# Patient Record
Sex: MALE | Race: WHITE | NOT HISPANIC OR LATINO | URBAN - METROPOLITAN AREA
[De-identification: names, ages, dates, MRNs, and addresses within clinical notes are randomized per-mention and may not be internally consistent; named-entity substitution may affect disease eponyms.]

---

## 2017-04-21 ENCOUNTER — OUTPATIENT (OUTPATIENT)
Dept: OUTPATIENT SERVICES | Facility: HOSPITAL | Age: 57
LOS: 1 days | End: 2017-04-21
Payer: COMMERCIAL

## 2017-04-21 VITALS
SYSTOLIC BLOOD PRESSURE: 106 MMHG | DIASTOLIC BLOOD PRESSURE: 75 MMHG | HEIGHT: 70 IN | OXYGEN SATURATION: 96 % | RESPIRATION RATE: 18 BRPM | HEART RATE: 75 BPM | TEMPERATURE: 98 F | WEIGHT: 315 LBS

## 2017-04-21 DIAGNOSIS — Z01.818 ENCOUNTER FOR OTHER PREPROCEDURAL EXAMINATION: ICD-10-CM

## 2017-04-21 DIAGNOSIS — E66.01 MORBID (SEVERE) OBESITY DUE TO EXCESS CALORIES: ICD-10-CM

## 2017-04-21 DIAGNOSIS — Z86.018 PERSONAL HISTORY OF OTHER BENIGN NEOPLASM: Chronic | ICD-10-CM

## 2017-04-21 DIAGNOSIS — Z98.84 BARIATRIC SURGERY STATUS: Chronic | ICD-10-CM

## 2017-04-21 DIAGNOSIS — I10 ESSENTIAL (PRIMARY) HYPERTENSION: ICD-10-CM

## 2017-04-21 DIAGNOSIS — Z98.890 OTHER SPECIFIED POSTPROCEDURAL STATES: Chronic | ICD-10-CM

## 2017-04-21 DIAGNOSIS — G47.33 OBSTRUCTIVE SLEEP APNEA (ADULT) (PEDIATRIC): ICD-10-CM

## 2017-04-21 DIAGNOSIS — Z87.2 PERSONAL HISTORY OF DISEASES OF THE SKIN AND SUBCUTANEOUS TISSUE: Chronic | ICD-10-CM

## 2017-04-21 LAB
ALBUMIN SERPL ELPH-MCNC: 4.3 G/DL — SIGNIFICANT CHANGE UP (ref 3.3–5)
ALP SERPL-CCNC: 41 U/L — SIGNIFICANT CHANGE UP (ref 40–120)
ALT FLD-CCNC: 25 U/L RC — SIGNIFICANT CHANGE UP (ref 10–45)
ANION GAP SERPL CALC-SCNC: 14 MMOL/L — SIGNIFICANT CHANGE UP (ref 5–17)
AST SERPL-CCNC: 21 U/L — SIGNIFICANT CHANGE UP (ref 10–40)
BILIRUB SERPL-MCNC: 0.6 MG/DL — SIGNIFICANT CHANGE UP (ref 0.2–1.2)
BLD GP AB SCN SERPL QL: NEGATIVE — SIGNIFICANT CHANGE UP
BUN SERPL-MCNC: 19 MG/DL — SIGNIFICANT CHANGE UP (ref 7–23)
CALCIUM SERPL-MCNC: 9.6 MG/DL — SIGNIFICANT CHANGE UP (ref 8.4–10.5)
CHLORIDE SERPL-SCNC: 102 MMOL/L — SIGNIFICANT CHANGE UP (ref 96–108)
CO2 SERPL-SCNC: 23 MMOL/L — SIGNIFICANT CHANGE UP (ref 22–31)
CREAT SERPL-MCNC: 0.69 MG/DL — SIGNIFICANT CHANGE UP (ref 0.5–1.3)
GLUCOSE SERPL-MCNC: 127 MG/DL — HIGH (ref 70–99)
HCT VFR BLD CALC: 47.2 % — SIGNIFICANT CHANGE UP (ref 39–50)
HGB BLD-MCNC: 16.1 G/DL — SIGNIFICANT CHANGE UP (ref 13–17)
MCHC RBC-ENTMCNC: 32.1 PG — SIGNIFICANT CHANGE UP (ref 27–34)
MCHC RBC-ENTMCNC: 34.1 GM/DL — SIGNIFICANT CHANGE UP (ref 32–36)
MCV RBC AUTO: 93.9 FL — SIGNIFICANT CHANGE UP (ref 80–100)
PLATELET # BLD AUTO: 217 K/UL — SIGNIFICANT CHANGE UP (ref 150–400)
POTASSIUM SERPL-MCNC: 4.2 MMOL/L — SIGNIFICANT CHANGE UP (ref 3.5–5.3)
POTASSIUM SERPL-SCNC: 4.2 MMOL/L — SIGNIFICANT CHANGE UP (ref 3.5–5.3)
PROT SERPL-MCNC: 7.5 G/DL — SIGNIFICANT CHANGE UP (ref 6–8.3)
RBC # BLD: 5.02 M/UL — SIGNIFICANT CHANGE UP (ref 4.2–5.8)
RBC # FLD: 12.1 % — SIGNIFICANT CHANGE UP (ref 10.3–14.5)
RH IG SCN BLD-IMP: POSITIVE — SIGNIFICANT CHANGE UP
SODIUM SERPL-SCNC: 139 MMOL/L — SIGNIFICANT CHANGE UP (ref 135–145)
WBC # BLD: 7.3 K/UL — SIGNIFICANT CHANGE UP (ref 3.8–10.5)
WBC # FLD AUTO: 7.3 K/UL — SIGNIFICANT CHANGE UP (ref 3.8–10.5)

## 2017-04-21 PROCEDURE — 85027 COMPLETE CBC AUTOMATED: CPT

## 2017-04-21 PROCEDURE — 86850 RBC ANTIBODY SCREEN: CPT

## 2017-04-21 PROCEDURE — 86900 BLOOD TYPING SEROLOGIC ABO: CPT

## 2017-04-21 PROCEDURE — G0463: CPT

## 2017-04-21 PROCEDURE — 80053 COMPREHEN METABOLIC PANEL: CPT

## 2017-04-21 PROCEDURE — 86901 BLOOD TYPING SEROLOGIC RH(D): CPT

## 2017-04-21 NOTE — H&P PST ADULT - PSH
H/O hernia repair    H/O laparoscopic adjustable gastric banding  2003- Defalted in 01/2017  H/O lipoma  h/o excision lipoma back  H/O melanoma excision    H/O pilonidal cyst  h/o excision plilonidal cyst

## 2017-04-21 NOTE — H&P PST ADULT - PMH
Anxiety    BPH (benign prostatic hypertrophy)    Degenerative disc disease, cervical    Depression    Dyslipidemia    Essential hypertension    GERD (gastroesophageal reflux disease)    Glaucoma    Morbid obesity due to excess calories    ROSIO (obstructive sleep apnea)    Osteoarthritis (arthritis due to wear and tear of joints)    Vertigo

## 2017-04-21 NOTE — H&P PST ADULT - HISTORY OF PRESENT ILLNESS
57 year old male with h/o morbid obesity, HTN, dyslipidemia, GERD, BPH , s/p lap band surgery for weight loss in 2003. Pt c/o GERD symptoms & unsuccessful weight loss- had surgical consult- for lap removal of gastric band, convert to vertical sleeve gastrectomy on 05/-3/2017

## 2017-04-27 ENCOUNTER — FORM ENCOUNTER (OUTPATIENT)
Age: 57
End: 2017-04-27

## 2017-04-28 ENCOUNTER — OUTPATIENT (OUTPATIENT)
Dept: OUTPATIENT SERVICES | Facility: HOSPITAL | Age: 57
LOS: 1 days | End: 2017-04-28
Payer: COMMERCIAL

## 2017-04-28 ENCOUNTER — APPOINTMENT (OUTPATIENT)
Dept: SURGERY | Facility: CLINIC | Age: 57
End: 2017-04-28

## 2017-04-28 DIAGNOSIS — Z98.890 OTHER SPECIFIED POSTPROCEDURAL STATES: Chronic | ICD-10-CM

## 2017-04-28 DIAGNOSIS — Z86.018 PERSONAL HISTORY OF OTHER BENIGN NEOPLASM: Chronic | ICD-10-CM

## 2017-04-28 DIAGNOSIS — Z98.84 BARIATRIC SURGERY STATUS: Chronic | ICD-10-CM

## 2017-04-28 DIAGNOSIS — R10.9 UNSPECIFIED ABDOMINAL PAIN: ICD-10-CM

## 2017-04-28 DIAGNOSIS — Z87.2 PERSONAL HISTORY OF DISEASES OF THE SKIN AND SUBCUTANEOUS TISSUE: Chronic | ICD-10-CM

## 2017-04-28 PROCEDURE — 76700 US EXAM ABDOM COMPLETE: CPT | Mod: 26

## 2017-04-28 PROCEDURE — 76700 US EXAM ABDOM COMPLETE: CPT

## 2017-05-03 ENCOUNTER — TRANSCRIPTION ENCOUNTER (OUTPATIENT)
Age: 57
End: 2017-05-03

## 2017-05-03 ENCOUNTER — APPOINTMENT (OUTPATIENT)
Dept: SURGERY | Facility: HOSPITAL | Age: 57
End: 2017-05-03

## 2017-05-03 ENCOUNTER — RESULT REVIEW (OUTPATIENT)
Age: 57
End: 2017-05-03

## 2017-05-03 ENCOUNTER — INPATIENT (INPATIENT)
Facility: HOSPITAL | Age: 57
LOS: 0 days | Discharge: HOME | DRG: 327 | End: 2017-05-04
Attending: SURGERY | Admitting: SURGERY
Payer: COMMERCIAL

## 2017-05-03 VITALS
OXYGEN SATURATION: 95 % | TEMPERATURE: 98 F | HEIGHT: 70 IN | HEART RATE: 64 BPM | SYSTOLIC BLOOD PRESSURE: 113 MMHG | RESPIRATION RATE: 20 BRPM | WEIGHT: 315 LBS | DIASTOLIC BLOOD PRESSURE: 74 MMHG

## 2017-05-03 DIAGNOSIS — Z98.890 OTHER SPECIFIED POSTPROCEDURAL STATES: Chronic | ICD-10-CM

## 2017-05-03 DIAGNOSIS — Z86.018 PERSONAL HISTORY OF OTHER BENIGN NEOPLASM: Chronic | ICD-10-CM

## 2017-05-03 DIAGNOSIS — E66.01 MORBID (SEVERE) OBESITY DUE TO EXCESS CALORIES: ICD-10-CM

## 2017-05-03 DIAGNOSIS — Z98.84 BARIATRIC SURGERY STATUS: Chronic | ICD-10-CM

## 2017-05-03 DIAGNOSIS — Z87.2 PERSONAL HISTORY OF DISEASES OF THE SKIN AND SUBCUTANEOUS TISSUE: Chronic | ICD-10-CM

## 2017-05-03 LAB
ANION GAP SERPL CALC-SCNC: 15 MMOL/L — SIGNIFICANT CHANGE UP (ref 5–17)
BASOPHILS # BLD AUTO: 0.1 K/UL — SIGNIFICANT CHANGE UP (ref 0–0.2)
BASOPHILS NFR BLD AUTO: 0.5 % — SIGNIFICANT CHANGE UP (ref 0–2)
BUN SERPL-MCNC: 11 MG/DL — SIGNIFICANT CHANGE UP (ref 7–23)
CALCIUM SERPL-MCNC: 8.3 MG/DL — LOW (ref 8.4–10.5)
CHLORIDE SERPL-SCNC: 104 MMOL/L — SIGNIFICANT CHANGE UP (ref 96–108)
CO2 SERPL-SCNC: 20 MMOL/L — LOW (ref 22–31)
CREAT SERPL-MCNC: 0.74 MG/DL — SIGNIFICANT CHANGE UP (ref 0.5–1.3)
EOSINOPHIL # BLD AUTO: 0 K/UL — SIGNIFICANT CHANGE UP (ref 0–0.5)
EOSINOPHIL NFR BLD AUTO: 0.2 % — SIGNIFICANT CHANGE UP (ref 0–6)
GLUCOSE SERPL-MCNC: 189 MG/DL — HIGH (ref 70–99)
HCT VFR BLD CALC: 41.9 % — SIGNIFICANT CHANGE UP (ref 39–50)
HGB BLD-MCNC: 14.7 G/DL — SIGNIFICANT CHANGE UP (ref 13–17)
LYMPHOCYTES # BLD AUTO: 0.6 K/UL — LOW (ref 1–3.3)
LYMPHOCYTES # BLD AUTO: 4.2 % — LOW (ref 13–44)
MAGNESIUM SERPL-MCNC: 2.1 MG/DL — SIGNIFICANT CHANGE UP (ref 1.6–2.6)
MCHC RBC-ENTMCNC: 33 PG — SIGNIFICANT CHANGE UP (ref 27–34)
MCHC RBC-ENTMCNC: 35 GM/DL — SIGNIFICANT CHANGE UP (ref 32–36)
MCV RBC AUTO: 94.2 FL — SIGNIFICANT CHANGE UP (ref 80–100)
MONOCYTES # BLD AUTO: 0.2 K/UL — SIGNIFICANT CHANGE UP (ref 0–0.9)
MONOCYTES NFR BLD AUTO: 1.4 % — LOW (ref 2–14)
NEUTROPHILS # BLD AUTO: 13.3 K/UL — HIGH (ref 1.8–7.4)
NEUTROPHILS NFR BLD AUTO: 93.6 % — HIGH (ref 43–77)
PHOSPHATE SERPL-MCNC: 3.3 MG/DL — SIGNIFICANT CHANGE UP (ref 2.5–4.5)
PLATELET # BLD AUTO: 183 K/UL — SIGNIFICANT CHANGE UP (ref 150–400)
POTASSIUM SERPL-MCNC: 4.3 MMOL/L — SIGNIFICANT CHANGE UP (ref 3.5–5.3)
POTASSIUM SERPL-SCNC: 4.3 MMOL/L — SIGNIFICANT CHANGE UP (ref 3.5–5.3)
RBC # BLD: 4.45 M/UL — SIGNIFICANT CHANGE UP (ref 4.2–5.8)
RBC # FLD: 11.9 % — SIGNIFICANT CHANGE UP (ref 10.3–14.5)
RH IG SCN BLD-IMP: POSITIVE — SIGNIFICANT CHANGE UP
SODIUM SERPL-SCNC: 139 MMOL/L — SIGNIFICANT CHANGE UP (ref 135–145)
WBC # BLD: 14.2 K/UL — HIGH (ref 3.8–10.5)
WBC # FLD AUTO: 14.2 K/UL — HIGH (ref 3.8–10.5)

## 2017-05-03 PROCEDURE — 88305 TISSUE EXAM BY PATHOLOGIST: CPT | Mod: 26

## 2017-05-03 RX ORDER — HYDROMORPHONE HYDROCHLORIDE 2 MG/ML
0.5 INJECTION INTRAMUSCULAR; INTRAVENOUS; SUBCUTANEOUS
Qty: 0 | Refills: 0 | Status: DISCONTINUED | OUTPATIENT
Start: 2017-05-03 | End: 2017-05-03

## 2017-05-03 RX ORDER — OMEPRAZOLE 10 MG/1
1 CAPSULE, DELAYED RELEASE ORAL
Qty: 30 | Refills: 0 | OUTPATIENT
Start: 2017-05-03 | End: 2017-06-02

## 2017-05-03 RX ORDER — HEPARIN SODIUM 5000 [USP'U]/ML
5000 INJECTION INTRAVENOUS; SUBCUTANEOUS EVERY 8 HOURS
Qty: 0 | Refills: 0 | Status: DISCONTINUED | OUTPATIENT
Start: 2017-05-03 | End: 2017-05-04

## 2017-05-03 RX ORDER — KETOROLAC TROMETHAMINE 30 MG/ML
30 SYRINGE (ML) INJECTION EVERY 6 HOURS
Qty: 0 | Refills: 0 | Status: DISCONTINUED | OUTPATIENT
Start: 2017-05-03 | End: 2017-05-04

## 2017-05-03 RX ORDER — HEPARIN SODIUM 5000 [USP'U]/ML
5000 INJECTION INTRAVENOUS; SUBCUTANEOUS ONCE
Qty: 0 | Refills: 0 | Status: COMPLETED | OUTPATIENT
Start: 2017-05-03 | End: 2017-05-03

## 2017-05-03 RX ORDER — SODIUM CHLORIDE 9 MG/ML
3 INJECTION INTRAMUSCULAR; INTRAVENOUS; SUBCUTANEOUS EVERY 8 HOURS
Qty: 0 | Refills: 0 | Status: DISCONTINUED | OUTPATIENT
Start: 2017-05-03 | End: 2017-05-03

## 2017-05-03 RX ORDER — ACETAMINOPHEN 500 MG
1000 TABLET ORAL ONCE
Qty: 0 | Refills: 0 | Status: COMPLETED | OUTPATIENT
Start: 2017-05-04 | End: 2017-05-04

## 2017-05-03 RX ORDER — OXYCODONE HYDROCHLORIDE 5 MG/1
5 TABLET ORAL
Qty: 120 | Refills: 0 | OUTPATIENT
Start: 2017-05-03 | End: 2017-05-07

## 2017-05-03 RX ORDER — VENLAFAXINE HCL 75 MG
1 CAPSULE, EXT RELEASE 24 HR ORAL
Qty: 0 | Refills: 0 | COMMUNITY

## 2017-05-03 RX ORDER — ONDANSETRON 8 MG/1
4 TABLET, FILM COATED ORAL EVERY 6 HOURS
Qty: 0 | Refills: 0 | Status: DISCONTINUED | OUTPATIENT
Start: 2017-05-03 | End: 2017-05-04

## 2017-05-03 RX ORDER — ACETAMINOPHEN 500 MG
1000 TABLET ORAL ONCE
Qty: 0 | Refills: 0 | Status: COMPLETED | OUTPATIENT
Start: 2017-05-03 | End: 2017-05-03

## 2017-05-03 RX ORDER — SODIUM CHLORIDE 9 MG/ML
1000 INJECTION, SOLUTION INTRAVENOUS
Qty: 0 | Refills: 0 | Status: COMPLETED | OUTPATIENT
Start: 2017-05-03 | End: 2017-05-03

## 2017-05-03 RX ORDER — PANTOPRAZOLE SODIUM 20 MG/1
40 TABLET, DELAYED RELEASE ORAL DAILY
Qty: 0 | Refills: 0 | Status: DISCONTINUED | OUTPATIENT
Start: 2017-05-03 | End: 2017-05-04

## 2017-05-03 RX ORDER — HYOSCYAMINE SULFATE 0.13 MG
0.12 TABLET ORAL EVERY 6 HOURS
Qty: 0 | Refills: 0 | Status: DISCONTINUED | OUTPATIENT
Start: 2017-05-03 | End: 2017-05-04

## 2017-05-03 RX ORDER — HYOSCYAMINE SULFATE 0.13 MG
1 TABLET ORAL
Qty: 28 | Refills: 0 | OUTPATIENT
Start: 2017-05-03 | End: 2017-05-10

## 2017-05-03 RX ORDER — FLUOXETINE HCL 10 MG
10 CAPSULE ORAL
Qty: 0 | Refills: 0 | COMMUNITY

## 2017-05-03 RX ORDER — POTASSIUM CHLORIDE 20 MEQ
10 PACKET (EA) ORAL
Qty: 0 | Refills: 0 | Status: DISCONTINUED | OUTPATIENT
Start: 2017-05-03 | End: 2017-05-04

## 2017-05-03 RX ORDER — VANCOMYCIN HCL 1 G
2000 VIAL (EA) INTRAVENOUS ONCE
Qty: 0 | Refills: 0 | Status: COMPLETED | OUTPATIENT
Start: 2017-05-03 | End: 2017-05-03

## 2017-05-03 RX ORDER — SODIUM CHLORIDE 9 MG/ML
1000 INJECTION, SOLUTION INTRAVENOUS
Qty: 0 | Refills: 0 | Status: DISCONTINUED | OUTPATIENT
Start: 2017-05-03 | End: 2017-05-04

## 2017-05-03 RX ORDER — VANCOMYCIN HCL 1 G
1500 VIAL (EA) INTRAVENOUS ONCE
Qty: 0 | Refills: 0 | Status: COMPLETED | OUTPATIENT
Start: 2017-05-03 | End: 2017-05-03

## 2017-05-03 RX ORDER — METOPROLOL TARTRATE 50 MG
5 TABLET ORAL EVERY 6 HOURS
Qty: 0 | Refills: 0 | Status: DISCONTINUED | OUTPATIENT
Start: 2017-05-03 | End: 2017-05-04

## 2017-05-03 RX ADMIN — HYDROMORPHONE HYDROCHLORIDE 0.5 MILLIGRAM(S): 2 INJECTION INTRAMUSCULAR; INTRAVENOUS; SUBCUTANEOUS at 17:15

## 2017-05-03 RX ADMIN — PANTOPRAZOLE SODIUM 40 MILLIGRAM(S): 20 TABLET, DELAYED RELEASE ORAL at 17:02

## 2017-05-03 RX ADMIN — HYDROMORPHONE HYDROCHLORIDE 0.5 MILLIGRAM(S): 2 INJECTION INTRAMUSCULAR; INTRAVENOUS; SUBCUTANEOUS at 16:55

## 2017-05-03 RX ADMIN — HEPARIN SODIUM 5000 UNIT(S): 5000 INJECTION INTRAVENOUS; SUBCUTANEOUS at 09:09

## 2017-05-03 RX ADMIN — Medication 30 MILLIGRAM(S): at 23:29

## 2017-05-03 RX ADMIN — Medication 400 MILLIGRAM(S): at 21:24

## 2017-05-03 RX ADMIN — SODIUM CHLORIDE 200 MILLILITER(S): 9 INJECTION, SOLUTION INTRAVENOUS at 17:00

## 2017-05-03 RX ADMIN — SODIUM CHLORIDE 150 MILLILITER(S): 9 INJECTION, SOLUTION INTRAVENOUS at 23:29

## 2017-05-03 RX ADMIN — Medication 0.12 MILLIGRAM(S): at 17:56

## 2017-05-03 RX ADMIN — SODIUM CHLORIDE 150 MILLILITER(S): 9 INJECTION, SOLUTION INTRAVENOUS at 21:24

## 2017-05-03 RX ADMIN — Medication 0.12 MILLIGRAM(S): at 23:28

## 2017-05-03 RX ADMIN — Medication 30 MILLIGRAM(S): at 18:30

## 2017-05-03 RX ADMIN — Medication 30 MILLIGRAM(S): at 17:51

## 2017-05-03 RX ADMIN — SODIUM CHLORIDE 150 MILLILITER(S): 9 INJECTION, SOLUTION INTRAVENOUS at 16:10

## 2017-05-03 RX ADMIN — Medication 230.77 MILLIGRAM(S): at 21:23

## 2017-05-03 RX ADMIN — HEPARIN SODIUM 5000 UNIT(S): 5000 INJECTION INTRAVENOUS; SUBCUTANEOUS at 21:24

## 2017-05-03 RX ADMIN — Medication 1000 MILLIGRAM(S): at 21:45

## 2017-05-03 RX ADMIN — ONDANSETRON 4 MILLIGRAM(S): 8 TABLET, FILM COATED ORAL at 17:51

## 2017-05-03 RX ADMIN — ONDANSETRON 4 MILLIGRAM(S): 8 TABLET, FILM COATED ORAL at 23:29

## 2017-05-03 NOTE — DISCHARGE NOTE ADULT - MEDICATION SUMMARY - MEDICATIONS TO TAKE
I will START or STAY ON the medications listed below when I get home from the hospital:    oxyCODONE 5 mg/5 mL oral solution  -- 5 milliliter(s) by mouth every 4 hours MDD:30  -- Caution federal law prohibits the transfer of this drug to any person other  than the person for whom it was prescribed.  May cause drowsiness.  Alcohol may intensify this effect.  Use care when operating dangerous machinery.  This prescription cannot be refilled.  Using more of this medication than prescribed may cause serious breathing problems.    -- Indication: For pain    olmesartan 20 mg oral tablet  -- 1 tab(s) by mouth once a day - start after surgery for 1 month  -- Indication: For Hypertension    tamsulosin 0.4 mg oral capsule  -- 1 cap(s) by mouth once a day (at bedtime)  -- Indication: For BPH (benign prostatic hypertrophy)    FLUoxetine 20 mg/5 mL oral solution  -- 10 milliliter(s) by mouth once a day (at bedtime) (40mg )  -- Indication: For Depression    atorvastatin 20 mg oral tablet  -- 1 tab(s) by mouth once a day (at bedtime)  -- Indication: For Hyperlipidemia    hyoscyamine 0.125 mg sublingual tablet  -- 1 tab(s) under tongue every 6 hours MDD:4  -- May cause drowsiness.  Alcohol may intensify this effect.  Use care when operating dangerous machinery.    -- Indication: For Gastric spasm    cyclobenzaprine 10 mg oral tablet  -- 1 tab(s) by mouth once a day (at bedtime), As Needed for back spasm  -- Indication: For Back pain    omeprazole 40 mg oral delayed release capsule  -- 1 cap(s) by mouth once a day MDD:1  -- do not swallow whole,mix content in applesauce  -- Indication: For reflux

## 2017-05-03 NOTE — DISCHARGE NOTE ADULT - PLAN OF CARE
pt make healthy lifestyle changes for improve quality of life call office [phone] for nausea, vomiting, fever, abdominal pain, redness and or drainage from abdominal  wound. For chest pain, shortness of breath, calf pain and swelling visit your nearest emergency room.  do not swallow whole pills, crush and mix in unsweetened applesauce.

## 2017-05-03 NOTE — BRIEF OPERATIVE NOTE - POST-OP DX
Gastric band malfunction  05/03/2017    Yan Anderson  Morbid obesity due to excess calories  05/03/2017    Active  Wilber, Yan

## 2017-05-03 NOTE — DISCHARGE NOTE ADULT - MEDICATION SUMMARY - MEDICATIONS TO STOP TAKING
I will STOP taking the medications listed below when I get home from the hospital:    venlafaxine 150 mg oral capsule, extended release  -- 1 cap(s) by mouth once a day (at bedtime) - hold for 1 month    olmesartan-hydrochlorothiazide 20 mg-12.5 mg oral tablet  -- 1 tab(s) by mouth once a day - hold for 1 month    hyoscyamine 0.125 mg sublingual tablet  -- 1 tab(s) under tongue every 6 hours MDD:4  -- May cause drowsiness.  Alcohol may intensify this effect.  Use care when operating dangerous machinery.

## 2017-05-03 NOTE — PHARMACY COMMUNICATION NOTE - COMMENTS
Patient medication reconciliation done. Patient currently taking: tamsulosin 0.4mg cap daily, Venlafaxine ER 150mg at bedtime for depression, atorvastatin 20mg at bedtime, olmesartan/HCTZ 20-12.5 mg daily, cyclobenzaprine 10mg PRN back pain, ibuprofen 800mg PRN pain .Patient was re-educated to take daily multi-vitamins post surgically. Patient re-educated on NSAID avoidance (Ibuprofen, ASA, naproxen, alleve) as they increased risk of GI bleeding. Patient educated to use APAP for mild pain otherwise contact prescriber for consult.  Per patient Dr. Prasad changed him from venlafaxine ER to fluoxetine liquid 40mg daily at bedtime and to take venlafaxine every other day so to taper off. Informed patient to stop venlafaxine ER post surgery. Patient was switched by Dr. Mena from olmesartan/HCTZ to olmesartan 20mg daily and follow up with prescriber for BP monitoring. Per Dr. Siddiqi he wants to keep the patient on Patient medication reconciliation done. Patient currently taking: tamsulosin 0.4mg cap daily, Venlafaxine ER 150mg at bedtime for depression, atorvastatin 20mg at bedtime, olmesartan/HCTZ 20-12.5 mg daily, cyclobenzaprine 10mg PRN back pain, ibuprofen 800mg PRN pain .Patient was re-educated to take daily multi-vitamins post surgically. Patient re-educated on NSAID avoidance (Ibuprofen, ASA, naproxen, alleve) as they increased risk of GI bleeding. Patient educated to use APAP for mild pain otherwise contact prescriber for consult.  Per patient Dr. Prasad changed him from venlafaxine ER to fluoxetine liquid 40mg daily at bedtime and to take venlafaxine every other day so to taper off. Informed patient to stop venlafaxine ER post surgery. Patient was switched by Dr. Mena from olmesartan/HCTZ to olmesartan 20mg daily and follow up with prescriber for BP monitoring. Per Dr. Siddiqi he wants to keep the patient on tamsulosin and patient was advised to open capsules and sprinkle content on to sugar free applesauce and take without crushing, monitor for dizziness and contact prescriber if patient side effects appear. Patient advised to avoid ibuprofen. Patient advised to crush all other medications. Patient medication reconciliation done. Patient currently taking: tamsulosin 0.4mg cap daily, Venlafaxine ER 150mg at bedtime for depression, atorvastatin 20mg at bedtime, olmesartan/HCTZ 20-12.5 mg daily, cyclobenzaprine 10mg PRN back pain, ibuprofen 800mg PRN pain .Patient was re-educated to take daily multi-vitamins post surgically. Patient re-educated on NSAID avoidance (Ibuprofen, ASA, naproxen, alleve) as they increased risk of GI bleeding. Patient educated to use APAP for mild pain otherwise contact prescriber for consult.  Per patient Dr. Prasad changed him from venlafaxine ER to fluoxetine liquid 40mg daily at bedtime and to take venlafaxine every other day so to taper off. Informed patient to stop venlafaxine ER post surgery. Patient was switched by Dr. Mena from olmesartan/HCTZ to olmesartan 20mg daily and follow up with prescriber for BP monitoring. Per Dr. Siddiqi he wants to keep the patient on tamsulosin and patient was advised to open capsules and sprinkle content on to sugar free applesauce and take without crushing, monitor for dizziness and contact prescriber if patient side effects appear. Patient advised to avoid ibuprofen. Patient advised to crush all other medications.  Patient stated he was having vivid dreams and fogginess on days he is off of venlafaxine. Patient contacted Dr. Prasad who stated for patient to contact him on Friday for adjustments in therapy.

## 2017-05-03 NOTE — BRIEF OPERATIVE NOTE - PROCEDURE
Laparoscopic removal of gastric band and port  05/03/2017    Active  [id number]  Gastrectomy, sleeve, laparoscopic  05/03/2017    Active  [id number]

## 2017-05-03 NOTE — DISCHARGE NOTE ADULT - CARE PLAN
Principal Discharge DX:	Morbid obesity due to excess calories  Goal:	pt make healthy lifestyle changes for improve quality of life  Instructions for follow-up, activity and diet:	call office [phone] for nausea, vomiting, fever, abdominal pain, redness and or drainage from abdominal  wound. For chest pain, shortness of breath, calf pain and swelling visit your nearest emergency room.  do not swallow whole pills, crush and mix in unsweetened applesauce. Principal Discharge DX:	Morbid obesity due to excess calories  Goal:	pt make healthy lifestyle changes for improve quality of life  Instructions for follow-up, activity and diet:	call office [phone] for nausea, vomiting, fever, abdominal pain, redness and or drainage from abdominal  wound. For chest pain, shortness of breath, calf pain and swelling visit your nearest emergency room.  do not swallow whole pills, crush and mix in unsweetened applesauce. Principal Discharge DX:	Morbid obesity due to excess calories  Goal:	pt make healthy lifestyle changes for improve quality of life  Instructions for follow-up, activity and diet:	call office [phone] for nausea, vomiting, fever, abdominal pain, redness and or drainage from abdominal  wound. For chest pain, shortness of breath, calf pain and swelling visit your nearest emergency room.  do not swallow whole pills, crush and mix in unsweetened applesauce. Principal Discharge DX:	Morbid obesity due to excess calories  Goal:	pt make healthy lifestyle changes for improve quality of life  Instructions for follow-up, activity and diet:	call office [phone] for nausea, vomiting, fever, abdominal pain, redness and or drainage from abdominal  wound. For chest pain, shortness of breath, calf pain and swelling visit your nearest emergency room.  do not swallow whole pills, crush and mix in unsweetened applesauce.

## 2017-05-03 NOTE — DISCHARGE NOTE ADULT - HOSPITAL COURSE
On 5/31/2017, 57 year old obese male underwent laparoscopic removal of adjustable gastric band converted to sleeve gastrectomy for BMI 45. He received perioperative VTE and SSI prophylaxis. Indwelling khalil catheter placed following induction. He tolerated the procedure well, was extubated and sent to PACU  in  stable condition. He remained hemodynamically stable, ambulated with assistance and was later  transferred to a bariatric unit with bedside continuous pulse oximetry.  Khalil removed and spontaneous returned of bladder function. His pain was controlled by IV pain medications and then transitioned to liquid oral pain medications when tolerating liquid diet.    POD #1 he was started on a bariatric clear liquid diet, which he tolerated.  He is ambulating independently, voiding, tolerating diet and with effective pain control. He is stable for discharge home. Prior to discharge procedure specific instructions explained including signs and symptoms of adverse events.  Bariatric medications obtained and he was subsequently discharged home. He was advised to  follow-up with Dr. Raya in 1 week as well as with his  primary care physician. He will also follow-up with his nutritionist in 30 days. On 5/3/2017, 57 year old obese male underwent laparoscopic removal of adjustable gastric band converted to sleeve gastrectomy for BMI 45. He received perioperative VTE and SSI prophylaxis. Indwelling khalil catheter placed following induction. He tolerated the procedure well, was extubated and sent to PACU  in  stable condition. He remained hemodynamically stable, ambulated with assistance and was later  transferred to a bariatric unit with bedside continuous pulse oximetry.  Khalil removed and spontaneous returned of bladder function. His pain was controlled by IV pain medications and then transitioned to liquid oral pain medications when tolerating liquid diet.    POD #1 he was started on a bariatric clear liquid diet, which he tolerated.  He is ambulating independently, voiding, tolerating diet and with effective pain control. He is stable for discharge home. Prior to discharge procedure specific instructions explained including signs and symptoms of adverse events.  Bariatric medications obtained and he was subsequently discharged home. He was advised to  follow-up with Dr. Raya in 1 week as well as with his  primary care physician. He will also follow-up with his nutritionist in 30 days.

## 2017-05-03 NOTE — DISCHARGE NOTE ADULT - CARE PROVIDER_API CALL
Waldemar Raya), Surgery  [address] [place] [address]  Phone: [phone]  Fax: [phone]

## 2017-05-04 VITALS
TEMPERATURE: 99 F | OXYGEN SATURATION: 97 % | HEART RATE: 57 BPM | RESPIRATION RATE: 18 BRPM | DIASTOLIC BLOOD PRESSURE: 63 MMHG | SYSTOLIC BLOOD PRESSURE: 109 MMHG

## 2017-05-04 LAB
ANION GAP SERPL CALC-SCNC: 12 MMOL/L — SIGNIFICANT CHANGE UP (ref 5–17)
BASOPHILS # BLD AUTO: 0 K/UL — SIGNIFICANT CHANGE UP (ref 0–0.2)
BASOPHILS NFR BLD AUTO: 0 % — SIGNIFICANT CHANGE UP (ref 0–2)
BUN SERPL-MCNC: 9 MG/DL — SIGNIFICANT CHANGE UP (ref 7–23)
CALCIUM SERPL-MCNC: 8.4 MG/DL — SIGNIFICANT CHANGE UP (ref 8.4–10.5)
CHLORIDE SERPL-SCNC: 105 MMOL/L — SIGNIFICANT CHANGE UP (ref 96–108)
CO2 SERPL-SCNC: 24 MMOL/L — SIGNIFICANT CHANGE UP (ref 22–31)
CREAT SERPL-MCNC: 0.68 MG/DL — SIGNIFICANT CHANGE UP (ref 0.5–1.3)
EOSINOPHIL # BLD AUTO: 0 K/UL — SIGNIFICANT CHANGE UP (ref 0–0.5)
EOSINOPHIL NFR BLD AUTO: 0 % — SIGNIFICANT CHANGE UP (ref 0–6)
GLUCOSE SERPL-MCNC: 118 MG/DL — HIGH (ref 70–99)
HCT VFR BLD CALC: 36.6 % — LOW (ref 39–50)
HGB BLD-MCNC: 12.6 G/DL — LOW (ref 13–17)
IMM GRANULOCYTES NFR BLD AUTO: 0.2 % — SIGNIFICANT CHANGE UP (ref 0–1.5)
LYMPHOCYTES # BLD AUTO: 1.08 K/UL — SIGNIFICANT CHANGE UP (ref 1–3.3)
LYMPHOCYTES # BLD AUTO: 11.2 % — LOW (ref 13–44)
MAGNESIUM SERPL-MCNC: 2.1 MG/DL — SIGNIFICANT CHANGE UP (ref 1.6–2.6)
MCHC RBC-ENTMCNC: 32.4 PG — SIGNIFICANT CHANGE UP (ref 27–34)
MCHC RBC-ENTMCNC: 34.4 GM/DL — SIGNIFICANT CHANGE UP (ref 32–36)
MCV RBC AUTO: 94.1 FL — SIGNIFICANT CHANGE UP (ref 80–100)
MONOCYTES # BLD AUTO: 0.86 K/UL — SIGNIFICANT CHANGE UP (ref 0–0.9)
MONOCYTES NFR BLD AUTO: 8.9 % — SIGNIFICANT CHANGE UP (ref 2–14)
NEUTROPHILS # BLD AUTO: 7.67 K/UL — HIGH (ref 1.8–7.4)
NEUTROPHILS NFR BLD AUTO: 79.7 % — HIGH (ref 43–77)
PHOSPHATE SERPL-MCNC: 2.8 MG/DL — SIGNIFICANT CHANGE UP (ref 2.5–4.5)
PLATELET # BLD AUTO: 173 K/UL — SIGNIFICANT CHANGE UP (ref 150–400)
POTASSIUM SERPL-MCNC: 4.4 MMOL/L — SIGNIFICANT CHANGE UP (ref 3.5–5.3)
POTASSIUM SERPL-SCNC: 4.4 MMOL/L — SIGNIFICANT CHANGE UP (ref 3.5–5.3)
RBC # BLD: 3.89 M/UL — LOW (ref 4.2–5.8)
RBC # FLD: 13.1 % — SIGNIFICANT CHANGE UP (ref 10.3–14.5)
SODIUM SERPL-SCNC: 141 MMOL/L — SIGNIFICANT CHANGE UP (ref 135–145)
WBC # BLD: 9.63 K/UL — SIGNIFICANT CHANGE UP (ref 3.8–10.5)
WBC # FLD AUTO: 9.63 K/UL — SIGNIFICANT CHANGE UP (ref 3.8–10.5)

## 2017-05-04 PROCEDURE — C1889: CPT

## 2017-05-04 PROCEDURE — 83735 ASSAY OF MAGNESIUM: CPT

## 2017-05-04 PROCEDURE — 86901 BLOOD TYPING SEROLOGIC RH(D): CPT

## 2017-05-04 PROCEDURE — 86900 BLOOD TYPING SEROLOGIC ABO: CPT

## 2017-05-04 PROCEDURE — 84100 ASSAY OF PHOSPHORUS: CPT

## 2017-05-04 PROCEDURE — 80048 BASIC METABOLIC PNL TOTAL CA: CPT

## 2017-05-04 PROCEDURE — 88305 TISSUE EXAM BY PATHOLOGIST: CPT

## 2017-05-04 PROCEDURE — 85027 COMPLETE CBC AUTOMATED: CPT

## 2017-05-04 RX ORDER — FUROSEMIDE 40 MG
10 TABLET ORAL ONCE
Qty: 0 | Refills: 0 | Status: COMPLETED | OUTPATIENT
Start: 2017-05-04 | End: 2017-05-04

## 2017-05-04 RX ORDER — SODIUM CHLORIDE 9 MG/ML
500 INJECTION INTRAMUSCULAR; INTRAVENOUS; SUBCUTANEOUS ONCE
Qty: 0 | Refills: 0 | Status: COMPLETED | OUTPATIENT
Start: 2017-05-04 | End: 2017-05-04

## 2017-05-04 RX ORDER — DOXAZOSIN MESYLATE 4 MG
2 TABLET ORAL ONCE
Qty: 0 | Refills: 0 | Status: DISCONTINUED | OUTPATIENT
Start: 2017-05-04 | End: 2017-05-04

## 2017-05-04 RX ADMIN — PANTOPRAZOLE SODIUM 40 MILLIGRAM(S): 20 TABLET, DELAYED RELEASE ORAL at 11:19

## 2017-05-04 RX ADMIN — Medication 400 MILLIGRAM(S): at 08:59

## 2017-05-04 RX ADMIN — HEPARIN SODIUM 5000 UNIT(S): 5000 INJECTION INTRAVENOUS; SUBCUTANEOUS at 05:05

## 2017-05-04 RX ADMIN — Medication 30 MILLIGRAM(S): at 11:19

## 2017-05-04 RX ADMIN — Medication 30 MILLIGRAM(S): at 00:18

## 2017-05-04 RX ADMIN — Medication 400 MILLIGRAM(S): at 02:42

## 2017-05-04 RX ADMIN — Medication 0.12 MILLIGRAM(S): at 05:05

## 2017-05-04 RX ADMIN — Medication 1000 MILLIGRAM(S): at 09:14

## 2017-05-04 RX ADMIN — ONDANSETRON 4 MILLIGRAM(S): 8 TABLET, FILM COATED ORAL at 05:05

## 2017-05-04 RX ADMIN — ONDANSETRON 4 MILLIGRAM(S): 8 TABLET, FILM COATED ORAL at 11:19

## 2017-05-04 RX ADMIN — Medication 1000 MILLIGRAM(S): at 02:44

## 2017-05-04 RX ADMIN — SODIUM CHLORIDE 1000 MILLILITER(S): 9 INJECTION INTRAMUSCULAR; INTRAVENOUS; SUBCUTANEOUS at 05:04

## 2017-05-04 RX ADMIN — Medication 30 MILLIGRAM(S): at 05:06

## 2017-05-04 RX ADMIN — Medication 30 MILLIGRAM(S): at 11:35

## 2017-05-04 RX ADMIN — Medication 10 MILLIGRAM(S): at 07:26

## 2017-05-04 RX ADMIN — HEPARIN SODIUM 5000 UNIT(S): 5000 INJECTION INTRAVENOUS; SUBCUTANEOUS at 13:19

## 2017-05-04 RX ADMIN — Medication 30 MILLIGRAM(S): at 05:07

## 2017-05-04 NOTE — DIETITIAN INITIAL EVALUATION ADULT. - ADHERENCE
Pt reports following a full liquid diet for 2 weeks PTA as instructed by outpatient RD. Pt reports having pureed tomato and lentil soup, protein shakes either [employer] (approved by outpatient RD) or [employer], triple zero yogurt, water, crystal light, skim milk, sugar free jello and pudding and coffee.

## 2017-05-04 NOTE — DIETITIAN INITIAL EVALUATION ADULT. - ENERGY NEEDS
Ht: 70“, Wt: 317 lbs- presurgical, BMI: 45.5 kg/m2, IBW: 166 lbs (+/-10%), %IBW: 191%   no edema or pressure injuries

## 2017-05-04 NOTE — DIETITIAN INITIAL EVALUATION ADULT. - OTHER INFO
Consult received for bariatric surgery. Pt has tried multiple weight loss attempts in the past per chart pt has been on multiple programs. Per outpatient RD note on 12/13/2016, pt reported reaching high wt of 365 pounds in 2006 and decided to have lap band surgery and was able to lose wt to 300 pounds within the 1st 6 months after surgery but had band complication and could not be adjusted properly. Pt started regaining some of the wt and was 322 pounds at outpatient RD visit on 12/13/16. Pt has also tried multiple wt loss attempts with [place], [employer] and working out with a nutritionist but was unable to but was unable successfully keep it off. Pt's pre-surgical testing wt was 324 pounds and his presurgical wt was 317 pounds. Pt now s/p laparoscopic removal of gastric band and vertical sleeve gastrectomy. Pt denies nausea/vomiting, sipping clear liquid bariatric diet at time of visit. Pt with knowledge of bariatric full liquid diet however receptive to in depth review/reinforcement. Pt states having some protein shakes at home and plans to purchase more. Pt states he also purchased some of the necessary vitamins. Pt was advised to purchase chewable/liquid multivitamin with added elemental iron, chewable/liquid calcium citrate with vitamin D and sublingual B12. Pt was advised to take the chewable/liquid multivitamin with added elemental iron at least 2 hours apart from the chewable/liquid calcium citrate with vitamin D for optimal absorption. Pt states he plans to schedule a follow up appointment with outpatient RD. Pt able to teach back all points discussed during interview. Consult received for bariatric surgery. Pt has tried multiple weight loss attempts in the past per chart pt has been on multiple programs. Per outpatient RD note on 12/13/2016, pt reported reaching high wt of 365 pounds in 2006 and decided to have lap band surgery and was able to lose wt to 300 pounds within the 1st 6 months after surgery but had band complications and could not be adjusted properly. Pt started regaining some of the wt and was 322 pounds at outpatient RD visit on 12/13/16. Pt has also tried multiple wt loss attempts with [place], [employer] and working with a nutritionist but was unable to but was unable successfully keep it off. Pt's pre-surgical testing wt was 324 pounds and his presurgical wt was 317 pounds. Pt now s/p laparoscopic removal of gastric band and vertical sleeve gastrectomy. Pt denies nausea/vomiting, sipping clear liquid bariatric diet at time of visit. Pt with knowledge of bariatric full liquid diet however receptive to in depth review/reinforcement. Pt states having some protein shakes at home and plans to purchase more. Pt states he also purchased some of the necessary vitamins. Pt was advised to purchase chewable/liquid multivitamin with added elemental iron, chewable/liquid calcium citrate with vitamin D and sublingual B12. Pt was advised to take the chewable/liquid multivitamin with added elemental iron at least 2 hours apart from the chewable/liquid calcium citrate with vitamin D for optimal absorption. Pt states he plans to schedule a follow up appointment with outpatient RD. Pt able to teach back all points discussed during interview.

## 2017-05-08 LAB — SURGICAL PATHOLOGY STUDY: SIGNIFICANT CHANGE UP

## 2017-05-12 ENCOUNTER — APPOINTMENT (OUTPATIENT)
Dept: SURGERY | Facility: CLINIC | Age: 57
End: 2017-05-12

## 2017-05-12 VITALS
HEART RATE: 72 BPM | SYSTOLIC BLOOD PRESSURE: 132 MMHG | TEMPERATURE: 98.1 F | RESPIRATION RATE: 16 BRPM | OXYGEN SATURATION: 97 % | HEIGHT: 70 IN | WEIGHT: 307.5 LBS | DIASTOLIC BLOOD PRESSURE: 84 MMHG | BODY MASS INDEX: 44.02 KG/M2

## 2017-05-12 RX ORDER — OMEPRAZOLE 40 MG/1
40 CAPSULE, DELAYED RELEASE ORAL
Qty: 30 | Refills: 0 | Status: DISCONTINUED | COMMUNITY
Start: 2017-05-04

## 2017-05-12 RX ORDER — POLYETHYLENE GLYCOL 3350, SODIUM CHLORIDE, SODIUM BICARBONATE AND POTASSIUM CHLORIDE WITH LEMON FLAVOR 420; 11.2; 5.72; 1.48 G/4L; G/4L; G/4L; G/4L
420 POWDER, FOR SOLUTION ORAL
Qty: 4000 | Refills: 0 | Status: DISCONTINUED | COMMUNITY
Start: 2016-12-07

## 2017-05-12 RX ORDER — IBUPROFEN 800 MG/1
800 TABLET, FILM COATED ORAL
Qty: 60 | Refills: 0 | Status: DISCONTINUED | COMMUNITY
Start: 2017-01-16

## 2017-05-12 RX ORDER — OLMESARTAN MEDOXOMIL AND HYDROCHLOROTHIAZIDE 20; 12.5 MG/1; MG/1
20-12.5 TABLET ORAL
Qty: 30 | Refills: 0 | Status: DISCONTINUED | COMMUNITY
Start: 2016-11-27

## 2017-05-12 RX ORDER — VALACYCLOVIR 1 G/1
1 TABLET, FILM COATED ORAL
Qty: 16 | Refills: 0 | Status: DISCONTINUED | COMMUNITY
Start: 2017-04-21

## 2017-05-12 RX ORDER — OXYCODONE HYDROCHLORIDE 5 MG/5ML
5 SOLUTION ORAL
Qty: 120 | Refills: 0 | Status: DISCONTINUED | COMMUNITY
Start: 2017-05-04

## 2017-05-12 RX ORDER — FLUOXETINE HYDROCHLORIDE 20 MG/5ML
20 SOLUTION ORAL
Qty: 300 | Refills: 0 | Status: DISCONTINUED | COMMUNITY
Start: 2017-04-25

## 2017-05-12 RX ORDER — CYCLOBENZAPRINE HYDROCHLORIDE 10 MG/1
10 TABLET, FILM COATED ORAL
Qty: 30 | Refills: 0 | Status: DISCONTINUED | COMMUNITY
Start: 2016-11-21

## 2017-05-12 RX ORDER — LEVOCETIRIZINE DIHYDROCHLORIDE 5 MG/1
5 TABLET ORAL
Qty: 10 | Refills: 0 | Status: DISCONTINUED | COMMUNITY
Start: 2017-01-29

## 2017-05-12 RX ORDER — AZITHROMYCIN 250 MG/1
250 TABLET, FILM COATED ORAL
Qty: 6 | Refills: 0 | Status: DISCONTINUED | COMMUNITY
Start: 2017-01-09

## 2017-05-12 RX ORDER — PEDI MULTIVIT 22/VIT D3/VIT K 1000-800
TABLET,CHEWABLE ORAL
Refills: 0 | Status: ACTIVE | COMMUNITY

## 2017-05-19 ENCOUNTER — APPOINTMENT (OUTPATIENT)
Dept: SURGERY | Facility: CLINIC | Age: 57
End: 2017-05-19

## 2017-05-19 VITALS
WEIGHT: 304 LBS | BODY MASS INDEX: 43.52 KG/M2 | HEART RATE: 67 BPM | DIASTOLIC BLOOD PRESSURE: 72 MMHG | SYSTOLIC BLOOD PRESSURE: 113 MMHG | OXYGEN SATURATION: 99 % | HEIGHT: 70 IN

## 2017-06-16 ENCOUNTER — APPOINTMENT (OUTPATIENT)
Dept: SURGERY | Facility: CLINIC | Age: 57
End: 2017-06-16

## 2017-06-16 VITALS
DIASTOLIC BLOOD PRESSURE: 77 MMHG | BODY MASS INDEX: 41.52 KG/M2 | SYSTOLIC BLOOD PRESSURE: 111 MMHG | OXYGEN SATURATION: 94 % | HEIGHT: 70 IN | HEART RATE: 68 BPM | WEIGHT: 290 LBS

## 2017-07-24 ENCOUNTER — APPOINTMENT (OUTPATIENT)
Dept: SURGERY | Facility: CLINIC | Age: 57
End: 2017-07-24

## 2017-07-24 VITALS
BODY MASS INDEX: 39.58 KG/M2 | HEART RATE: 64 BPM | WEIGHT: 276.5 LBS | TEMPERATURE: 98.1 F | HEIGHT: 70 IN | DIASTOLIC BLOOD PRESSURE: 79 MMHG | OXYGEN SATURATION: 99 % | RESPIRATION RATE: 15 BRPM | SYSTOLIC BLOOD PRESSURE: 121 MMHG

## 2017-07-24 DIAGNOSIS — E66.9 OBESITY, UNSPECIFIED: ICD-10-CM

## 2017-07-24 DIAGNOSIS — E53.8 DEFICIENCY OF OTHER SPECIFIED B GROUP VITAMINS: ICD-10-CM

## 2017-07-24 DIAGNOSIS — K21.9 GASTRO-ESOPHAGEAL REFLUX DISEASE W/OUT ESOPHAGITIS: ICD-10-CM

## 2017-07-24 DIAGNOSIS — E55.9 VITAMIN D DEFICIENCY, UNSPECIFIED: ICD-10-CM

## 2017-07-24 RX ORDER — FLUOXETINE HCL 10 MG
TABLET ORAL
Refills: 0 | Status: DISCONTINUED | COMMUNITY
End: 2017-07-24

## 2017-07-24 RX ORDER — HYOSCYAMINE SULFATE 0.12 MG/1
0.12 TABLET SUBLINGUAL
Qty: 28 | Refills: 0 | Status: DISCONTINUED | COMMUNITY
Start: 2017-05-04 | End: 2017-07-24

## 2017-07-24 RX ORDER — FLUTICASONE PROPIONATE 50 UG/1
50 SPRAY, METERED NASAL
Qty: 16 | Refills: 0 | Status: DISCONTINUED | COMMUNITY
Start: 2017-04-21 | End: 2017-07-24

## 2017-10-20 ENCOUNTER — APPOINTMENT (OUTPATIENT)
Dept: SURGERY | Facility: CLINIC | Age: 57
End: 2017-10-20

## 2019-03-21 ENCOUNTER — EMERGENCY (EMERGENCY)
Facility: HOSPITAL | Age: 59
LOS: 1 days | Discharge: HOME | End: 2019-03-21
Attending: EMERGENCY MEDICINE | Admitting: EMERGENCY MEDICINE
Payer: COMMERCIAL

## 2019-03-21 VITALS
TEMPERATURE: 98 F | HEART RATE: 90 BPM | HEIGHT: 70 IN | DIASTOLIC BLOOD PRESSURE: 65 MMHG | WEIGHT: 244.93 LBS | OXYGEN SATURATION: 99 % | SYSTOLIC BLOOD PRESSURE: 118 MMHG | RESPIRATION RATE: 16 BRPM

## 2019-03-21 VITALS
RESPIRATION RATE: 18 BRPM | SYSTOLIC BLOOD PRESSURE: 111 MMHG | OXYGEN SATURATION: 100 % | TEMPERATURE: 98 F | HEART RATE: 71 BPM | DIASTOLIC BLOOD PRESSURE: 56 MMHG

## 2019-03-21 DIAGNOSIS — Z98.890 OTHER SPECIFIED POSTPROCEDURAL STATES: Chronic | ICD-10-CM

## 2019-03-21 DIAGNOSIS — Z98.84 BARIATRIC SURGERY STATUS: Chronic | ICD-10-CM

## 2019-03-21 DIAGNOSIS — Z86.018 PERSONAL HISTORY OF OTHER BENIGN NEOPLASM: Chronic | ICD-10-CM

## 2019-03-21 DIAGNOSIS — Z87.2 PERSONAL HISTORY OF DISEASES OF THE SKIN AND SUBCUTANEOUS TISSUE: Chronic | ICD-10-CM

## 2019-03-21 PROBLEM — R42 DIZZINESS AND GIDDINESS: Chronic | Status: ACTIVE | Noted: 2017-04-21

## 2019-03-21 PROBLEM — F41.9 ANXIETY DISORDER, UNSPECIFIED: Chronic | Status: ACTIVE | Noted: 2017-04-21

## 2019-03-21 PROBLEM — E66.01 MORBID (SEVERE) OBESITY DUE TO EXCESS CALORIES: Chronic | Status: ACTIVE | Noted: 2017-04-21

## 2019-03-21 PROBLEM — F32.9 MAJOR DEPRESSIVE DISORDER, SINGLE EPISODE, UNSPECIFIED: Chronic | Status: ACTIVE | Noted: 2017-04-21

## 2019-03-21 PROBLEM — K21.9 GASTRO-ESOPHAGEAL REFLUX DISEASE WITHOUT ESOPHAGITIS: Chronic | Status: ACTIVE | Noted: 2017-04-21

## 2019-03-21 PROBLEM — N40.0 BENIGN PROSTATIC HYPERPLASIA WITHOUT LOWER URINARY TRACT SYMPTOMS: Chronic | Status: ACTIVE | Noted: 2017-04-21

## 2019-03-21 PROBLEM — E78.5 HYPERLIPIDEMIA, UNSPECIFIED: Chronic | Status: ACTIVE | Noted: 2017-04-21

## 2019-03-21 PROBLEM — M19.90 UNSPECIFIED OSTEOARTHRITIS, UNSPECIFIED SITE: Chronic | Status: ACTIVE | Noted: 2017-04-21

## 2019-03-21 PROBLEM — H40.9 UNSPECIFIED GLAUCOMA: Chronic | Status: ACTIVE | Noted: 2017-04-21

## 2019-03-21 PROBLEM — M50.30 OTHER CERVICAL DISC DEGENERATION, UNSPECIFIED CERVICAL REGION: Chronic | Status: ACTIVE | Noted: 2017-04-21

## 2019-03-21 PROBLEM — G47.33 OBSTRUCTIVE SLEEP APNEA (ADULT) (PEDIATRIC): Chronic | Status: ACTIVE | Noted: 2017-04-21

## 2019-03-21 PROBLEM — I10 ESSENTIAL (PRIMARY) HYPERTENSION: Chronic | Status: ACTIVE | Noted: 2017-04-21

## 2019-03-21 LAB
ALBUMIN SERPL ELPH-MCNC: 4.3 G/DL — SIGNIFICANT CHANGE UP (ref 3.3–5)
ALP SERPL-CCNC: 42 U/L — SIGNIFICANT CHANGE UP (ref 40–120)
ALT FLD-CCNC: 27 U/L — SIGNIFICANT CHANGE UP (ref 4–41)
ANION GAP SERPL CALC-SCNC: 10 MMO/L — SIGNIFICANT CHANGE UP (ref 7–14)
APTT BLD: 32.7 SEC — SIGNIFICANT CHANGE UP (ref 27.5–36.3)
AST SERPL-CCNC: 26 U/L — SIGNIFICANT CHANGE UP (ref 4–40)
BASOPHILS # BLD AUTO: 0.07 K/UL — SIGNIFICANT CHANGE UP (ref 0–0.2)
BASOPHILS NFR BLD AUTO: 1.1 % — SIGNIFICANT CHANGE UP (ref 0–2)
BILIRUB SERPL-MCNC: 0.6 MG/DL — SIGNIFICANT CHANGE UP (ref 0.2–1.2)
BUN SERPL-MCNC: 20 MG/DL — SIGNIFICANT CHANGE UP (ref 7–23)
CALCIUM SERPL-MCNC: 9.8 MG/DL — SIGNIFICANT CHANGE UP (ref 8.4–10.5)
CHLORIDE SERPL-SCNC: 103 MMOL/L — SIGNIFICANT CHANGE UP (ref 98–107)
CO2 SERPL-SCNC: 28 MMOL/L — SIGNIFICANT CHANGE UP (ref 22–31)
CREAT SERPL-MCNC: 1.03 MG/DL — SIGNIFICANT CHANGE UP (ref 0.5–1.3)
EOSINOPHIL # BLD AUTO: 0.18 K/UL — SIGNIFICANT CHANGE UP (ref 0–0.5)
EOSINOPHIL NFR BLD AUTO: 2.9 % — SIGNIFICANT CHANGE UP (ref 0–6)
GLUCOSE SERPL-MCNC: 103 MG/DL — HIGH (ref 70–99)
HCT VFR BLD CALC: 46.6 % — SIGNIFICANT CHANGE UP (ref 39–50)
HGB BLD-MCNC: 15.8 G/DL — SIGNIFICANT CHANGE UP (ref 13–17)
IMM GRANULOCYTES NFR BLD AUTO: 0.3 % — SIGNIFICANT CHANGE UP (ref 0–1.5)
INR BLD: 1.03 — SIGNIFICANT CHANGE UP (ref 0.88–1.17)
LYMPHOCYTES # BLD AUTO: 1.74 K/UL — SIGNIFICANT CHANGE UP (ref 1–3.3)
LYMPHOCYTES # BLD AUTO: 27.8 % — SIGNIFICANT CHANGE UP (ref 13–44)
MAGNESIUM SERPL-MCNC: 2 MG/DL — SIGNIFICANT CHANGE UP (ref 1.6–2.6)
MCHC RBC-ENTMCNC: 31.9 PG — SIGNIFICANT CHANGE UP (ref 27–34)
MCHC RBC-ENTMCNC: 33.9 % — SIGNIFICANT CHANGE UP (ref 32–36)
MCV RBC AUTO: 94 FL — SIGNIFICANT CHANGE UP (ref 80–100)
MONOCYTES # BLD AUTO: 0.52 K/UL — SIGNIFICANT CHANGE UP (ref 0–0.9)
MONOCYTES NFR BLD AUTO: 8.3 % — SIGNIFICANT CHANGE UP (ref 2–14)
NEUTROPHILS # BLD AUTO: 3.73 K/UL — SIGNIFICANT CHANGE UP (ref 1.8–7.4)
NEUTROPHILS NFR BLD AUTO: 59.6 % — SIGNIFICANT CHANGE UP (ref 43–77)
NRBC # FLD: 0 K/UL — LOW (ref 25–125)
NT-PROBNP SERPL-SCNC: 35.31 PG/ML — SIGNIFICANT CHANGE UP
PHOSPHATE SERPL-MCNC: 3.4 MG/DL — SIGNIFICANT CHANGE UP (ref 2.5–4.5)
PLATELET # BLD AUTO: 199 K/UL — SIGNIFICANT CHANGE UP (ref 150–400)
PMV BLD: 8.8 FL — SIGNIFICANT CHANGE UP (ref 7–13)
POTASSIUM SERPL-MCNC: 4.6 MMOL/L — SIGNIFICANT CHANGE UP (ref 3.5–5.3)
POTASSIUM SERPL-SCNC: 4.6 MMOL/L — SIGNIFICANT CHANGE UP (ref 3.5–5.3)
PROT SERPL-MCNC: 7 G/DL — SIGNIFICANT CHANGE UP (ref 6–8.3)
PROTHROM AB SERPL-ACNC: 11.7 SEC — SIGNIFICANT CHANGE UP (ref 9.8–13.1)
RBC # BLD: 4.96 M/UL — SIGNIFICANT CHANGE UP (ref 4.2–5.8)
RBC # FLD: 12.5 % — SIGNIFICANT CHANGE UP (ref 10.3–14.5)
SODIUM SERPL-SCNC: 141 MMOL/L — SIGNIFICANT CHANGE UP (ref 135–145)
TROPONIN T, HIGH SENSITIVITY: < 6 NG/L — SIGNIFICANT CHANGE UP (ref ?–14)
WBC # BLD: 6.26 K/UL — SIGNIFICANT CHANGE UP (ref 3.8–10.5)
WBC # FLD AUTO: 6.26 K/UL — SIGNIFICANT CHANGE UP (ref 3.8–10.5)

## 2019-03-21 PROCEDURE — 71045 X-RAY EXAM CHEST 1 VIEW: CPT | Mod: 26

## 2019-03-21 PROCEDURE — 93306 TTE W/DOPPLER COMPLETE: CPT | Mod: 26

## 2019-03-21 PROCEDURE — 93010 ELECTROCARDIOGRAM REPORT: CPT | Mod: 59

## 2019-03-21 PROCEDURE — 72125 CT NECK SPINE W/O DYE: CPT | Mod: 26

## 2019-03-21 PROCEDURE — 70450 CT HEAD/BRAIN W/O DYE: CPT | Mod: 26

## 2019-03-21 PROCEDURE — 99218: CPT | Mod: 25

## 2019-03-21 RX ORDER — PANTOPRAZOLE SODIUM 20 MG/1
40 TABLET, DELAYED RELEASE ORAL ONCE
Qty: 0 | Refills: 0 | Status: COMPLETED | OUTPATIENT
Start: 2019-03-21 | End: 2019-03-21

## 2019-03-21 RX ORDER — SODIUM CHLORIDE 9 MG/ML
1000 INJECTION INTRAMUSCULAR; INTRAVENOUS; SUBCUTANEOUS ONCE
Qty: 0 | Refills: 0 | Status: COMPLETED | OUTPATIENT
Start: 2019-03-21 | End: 2019-03-21

## 2019-03-21 RX ORDER — IBUPROFEN 200 MG
600 TABLET ORAL ONCE
Qty: 0 | Refills: 0 | Status: COMPLETED | OUTPATIENT
Start: 2019-03-21 | End: 2019-03-21

## 2019-03-21 RX ADMIN — Medication 600 MILLIGRAM(S): at 13:10

## 2019-03-21 RX ADMIN — SODIUM CHLORIDE 1000 MILLILITER(S): 9 INJECTION INTRAMUSCULAR; INTRAVENOUS; SUBCUTANEOUS at 11:31

## 2019-03-21 RX ADMIN — SODIUM CHLORIDE 1000 MILLILITER(S): 9 INJECTION INTRAMUSCULAR; INTRAVENOUS; SUBCUTANEOUS at 13:10

## 2019-03-21 RX ADMIN — Medication 600 MILLIGRAM(S): at 12:09

## 2019-03-21 RX ADMIN — PANTOPRAZOLE SODIUM 40 MILLIGRAM(S): 20 TABLET, DELAYED RELEASE ORAL at 11:41

## 2019-03-21 NOTE — ED ADULT NURSE NOTE - CHIEF COMPLAINT QUOTE
Pt arrives to ED s/p unwitnessed syncope while attempting to urinate.  Pt reports he felt dizzy and then woke up on floor of the tub.  Pt has laceration to top of head with no current active bleeding.  Pt LOC was less than a minute as per spouse who heard noise of fall and then heard pt call for help.  Pt reports taking blood thinners.  Pt reports pain to top of head, neck and upper back.  EKG in triage.  Pt reports he took a Cyclobenzaprine around 21:30 due to lower back pain which has now resolved.  Pt reports hx of vertigo.

## 2019-03-21 NOTE — ED CDU PROVIDER DISPOSITION NOTE - CLINICAL COURSE
59M s/p micturition syncope. Main ED ekg nonisch, trauma cat scans negative, CDU for tele, trop #2, and echo. AM no complaints. NAD, CTABL, RRR, S1S2, ABD SOFT NTND. CDU COURSE no tele events, ambulatory no deficit. Echo shows moderate stenosis, reviewed clinical signs of severe aortic stenosis (exertional syncope, frequent syncope, persistent orthostasis), and need to follow-up with Cardiologist and likely q1yr echo. Discussed concussion precautions. Pt feels well, will d/c home.

## 2019-03-21 NOTE — ED ADULT NURSE NOTE - NSIMPLEMENTINTERV_GEN_ALL_ED
Implemented All Universal Safety Interventions:  [place] to call system. Call bell, personal items and telephone within reach. Instruct patient to call for assistance. Room bathroom lighting operational. Non-slip footwear when patient is off stretcher. Physically safe environment: no spills, clutter or unnecessary equipment. Stretcher in lowest position, wheels locked, appropriate side rails in place.

## 2019-03-21 NOTE — ED ADULT NURSE NOTE - OBJECTIVE STATEMENT
60 yo AAO4 to room 3 s/p syncopal episode. pt states he was urinating, felt dizzy and passed out, superficial laceration noted to left scalp with no active bleeding, denies current CP/SOB, daily anticoagulation use, hx vertigo, NSR on monitor, VS as charted. 20G placed in RAC, labs drawn and sent, awaiting MD zarco, will ctm 60 yo AAO4 to room 3 s/p syncopal episode. pt states he was urinating, felt dizzy and passed out, superficial laceration noted to left scalp with no active bleeding, denies current CP/SOB, no neuro deficits noted on exam, daily anticoagulation use, hx vertigo, NSR on monitor, VS as charted. 20G placed in RAC, labs drawn and sent, awaiting MD zarco, will monitor

## 2019-03-21 NOTE — ED CDU PROVIDER DISPOSITION NOTE - NSFOLLOWUPINSTRUCTIONS_ED_ALL_ED_FT
Follow up with your primary care doctor and cardiologist within 1 week. Show copies of your reports given to you.   Take all of your other medications as previously prescribed.   Worsening or continued chest pain, shortness of breath, weakness,  or any other concerns return to ED.  Please return to the ED with worsening headache, nausea, vomiting, blurry vision, difficulty walking or any other concerns.

## 2019-03-21 NOTE — ED PROVIDER NOTE - PHYSICAL EXAMINATION
NCAT with small abrasion to left parietal scalp, no obvious laceartion, no active bleeding, no hematoma  (+)midline cervical tenderness.

## 2019-03-21 NOTE — ED CDU PROVIDER INITIAL DAY NOTE - PHYSICAL EXAMINATION
MSK: limited ROM of neck 2/2 pain, no midline spinal tenderness, strength 5/5 in all extremities, sensation intact and equal b/l

## 2019-03-21 NOTE — ED ADULT TRIAGE NOTE - CHIEF COMPLAINT QUOTE
Pt arrives to ED s/p unwitnessed syncope while attempting to urinate.  Pt reports he felt dizzy and then woke up on floor of the tub.  Pt has laceration to top of head with no current active bleeding.  Pt LOC was less than a minute as per spouse who heard noise of fall and then heard pt call for help.  Pt reports taking blood thinners.  Pt reports pain to top of head, neck and upper back.  EKG in triage. Pt arrives to ED s/p unwitnessed syncope while attempting to urinate.  Pt reports he felt dizzy and then woke up on floor of the tub.  Pt has laceration to top of head with no current active bleeding.  Pt LOC was less than a minute as per spouse who heard noise of fall and then heard pt call for help.  Pt reports taking blood thinners.  Pt reports pain to top of head, neck and upper back.  EKG in triage.  Pt reports he took a Cyclobenzaprine around 21:30 due to lower back pain which has now resolved. Pt arrives to ED s/p unwitnessed syncope while attempting to urinate.  Pt reports he felt dizzy and then woke up on floor of the tub.  Pt has laceration to top of head with no current active bleeding.  Pt LOC was less than a minute as per spouse who heard noise of fall and then heard pt call for help.  Pt reports taking blood thinners.  Pt reports pain to top of head, neck and upper back.  EKG in triage.  Pt reports he took a Cyclobenzaprine around 21:30 due to lower back pain which has now resolved.  Pt reports hx of vertigo.

## 2019-03-21 NOTE — ED CDU PROVIDER DISPOSITION NOTE - ATTENDING CONTRIBUTION TO CARE
Dr. Sharpe: I performed a face to face bedside interview with patient regarding history of present illness, review of symptoms and past medical history. I completed an independent physical exam.  I have discussed patient's plan of care with PA.   I agree with note as stated above, having amended the EMR as needed to reflect my findings.   This includes HISTORY OF PRESENT ILLNESS, HIV, PAST MEDICAL/SURGICAL/FAMILY/SOCIAL HISTORY, ALLERGIES AND HOME MEDICATIONS, REVIEW OF SYSTEMS, PHYSICAL EXAM, and any PROGRESS NOTES during the time I functioned as the attending physician for this patient.  59M s/p micturition syncope. Main ED ekg nonisch, trauma cat scans negative, CDU for tele, trop #2, and echo. AM no complaints. NAD, CTABL, RRR, S1S2, ABD SOFT NTND. CDU COURSE no tele events, ambulatory no deficit. Echo shows moderate stenosis, reviewed clinical signs of severe aortic stenosis (exertional syncope, frequent syncope, persistent orthostasis), and need to follow-up with Cardiologist and likely q1yr echo. Discussed concussion precautions. Pt feels well, will d/c home.

## 2019-03-21 NOTE — ED CDU PROVIDER INITIAL DAY NOTE - PROGRESS NOTE DETAILS
Discussed echo with pt showing moderate stenosis, he will follow up with his cardiologist. Discussed concussion precautions. Pt feels well, will d/c home

## 2019-03-21 NOTE — ED PROVIDER NOTE - CLINICAL SUMMARY MEDICAL DECISION MAKING FREE TEXT BOX
60 y/o M presents s/p syncopal episode at home.  Small contusion to scalp and midline cervical tenderness on exam.  Will obtain CT head/c-spine to r/o traumatic injury, exam otherwise atraumatic.  Will obtain ekg, labs, cxr, tele monitoring.  Sxs likely vasovagal, but consider CDU for tele monitoring and echo if initial work-up unremarkable.

## 2019-03-21 NOTE — ED CDU PROVIDER INITIAL DAY NOTE - MEDICAL DECISION MAKING DETAILS
59yM w/pmhx HTN, HLD, vertigo p/w syncope. Trop negative, EKG normal, CT head and cspine with disc bulges at C4-C5 otherwise normal. In CDU for tele and echo

## 2019-03-21 NOTE — ED CDU PROVIDER INITIAL DAY NOTE - OBJECTIVE STATEMENT
59yM w/pmhx HTN, HLD, vertigo p/w syncopal episode. Pt states he went to urinate this morning when all of a sudden he felt lightheaded, denies room spinning dizziness, and passed out, hitting his head on the bathtub. Pt states he woke by the sound of his head hitting the tube. Pt states since the syncopal episode he has b/l upper back and neck pain. Pt denies chest pain, shortness of breath, palpitations, recent travel or illness, leg pain or swelling, abd pain, n/v/d, recent illness or any other concerns.  In main ED, EKG normal, trop negative, CT head and c-spine normal, orthostatics normal  Pt sent to CDU for tele and echo 59yM w/pmhx HTN, HLD, vertigo p/w syncopal episode. Pt states he went to urinate this morning when all of a sudden he felt lightheaded, denies room spinning dizziness, and passed out, hitting his head on the bathtub. Pt states he woke by the sound of his head hitting the tube. Pt states since the syncopal episode he has b/l upper back and neck pain. Pt denies chest pain, shortness of breath, palpitations, recent travel or illness, leg pain or swelling, abd pain, n/v/d, recent illness or any other concerns. In main ED, EKG normal, trop negative, CT head and c-spine normal, orthostatics normal. Pt sent to CDU for tele and echo.

## 2019-03-21 NOTE — ED ADULT NURSE REASSESSMENT NOTE - NS ED NURSE REASSESS COMMENT FT1
received report from night RN, pt A&Ox4, VSS, pt states pain is minimal and does not required medication at this time, pt in C-collar and waiting for CT scan, will continue to monitor.

## 2019-04-06 ENCOUNTER — INPATIENT (INPATIENT)
Facility: HOSPITAL | Age: 59
LOS: 5 days | Discharge: HOME | End: 2019-04-12
Attending: INTERNAL MEDICINE | Admitting: INTERNAL MEDICINE
Payer: COMMERCIAL

## 2019-04-06 VITALS
RESPIRATION RATE: 18 BRPM | TEMPERATURE: 99 F | HEART RATE: 92 BPM | SYSTOLIC BLOOD PRESSURE: 135 MMHG | DIASTOLIC BLOOD PRESSURE: 83 MMHG

## 2019-04-06 DIAGNOSIS — Z87.2 PERSONAL HISTORY OF DISEASES OF THE SKIN AND SUBCUTANEOUS TISSUE: Chronic | ICD-10-CM

## 2019-04-06 DIAGNOSIS — Z98.84 BARIATRIC SURGERY STATUS: Chronic | ICD-10-CM

## 2019-04-06 DIAGNOSIS — Z86.018 PERSONAL HISTORY OF OTHER BENIGN NEOPLASM: Chronic | ICD-10-CM

## 2019-04-06 DIAGNOSIS — Z98.890 OTHER SPECIFIED POSTPROCEDURAL STATES: Chronic | ICD-10-CM

## 2019-04-06 DIAGNOSIS — I47.2 VENTRICULAR TACHYCARDIA: ICD-10-CM

## 2019-04-06 LAB
ANION GAP SERPL CALC-SCNC: 13 MMO/L — SIGNIFICANT CHANGE UP (ref 7–14)
BASOPHILS # BLD AUTO: 0.06 K/UL — SIGNIFICANT CHANGE UP (ref 0–0.2)
BASOPHILS NFR BLD AUTO: 0.7 % — SIGNIFICANT CHANGE UP (ref 0–2)
BUN SERPL-MCNC: 20 MG/DL — SIGNIFICANT CHANGE UP (ref 7–23)
CALCIUM SERPL-MCNC: 9.3 MG/DL — SIGNIFICANT CHANGE UP (ref 8.4–10.5)
CHLORIDE SERPL-SCNC: 101 MMOL/L — SIGNIFICANT CHANGE UP (ref 98–107)
CHOLEST SERPL-MCNC: 209 MG/DL — HIGH (ref 120–199)
CO2 SERPL-SCNC: 22 MMOL/L — SIGNIFICANT CHANGE UP (ref 22–31)
CREAT SERPL-MCNC: 0.84 MG/DL — SIGNIFICANT CHANGE UP (ref 0.5–1.3)
EOSINOPHIL # BLD AUTO: 0.17 K/UL — SIGNIFICANT CHANGE UP (ref 0–0.5)
EOSINOPHIL NFR BLD AUTO: 2.1 % — SIGNIFICANT CHANGE UP (ref 0–6)
GLUCOSE SERPL-MCNC: 103 MG/DL — HIGH (ref 70–99)
HCT VFR BLD CALC: 44.9 % — SIGNIFICANT CHANGE UP (ref 39–50)
HDLC SERPL-MCNC: 95 MG/DL — HIGH (ref 35–55)
HGB BLD-MCNC: 15 G/DL — SIGNIFICANT CHANGE UP (ref 13–17)
IMM GRANULOCYTES NFR BLD AUTO: 0.5 % — SIGNIFICANT CHANGE UP (ref 0–1.5)
LIPID PNL WITH DIRECT LDL SERPL: 115 MG/DL — SIGNIFICANT CHANGE UP
LYMPHOCYTES # BLD AUTO: 2.41 K/UL — SIGNIFICANT CHANGE UP (ref 1–3.3)
LYMPHOCYTES # BLD AUTO: 29.1 % — SIGNIFICANT CHANGE UP (ref 13–44)
MAGNESIUM SERPL-MCNC: 2 MG/DL — SIGNIFICANT CHANGE UP (ref 1.6–2.6)
MCHC RBC-ENTMCNC: 31.6 PG — SIGNIFICANT CHANGE UP (ref 27–34)
MCHC RBC-ENTMCNC: 33.4 % — SIGNIFICANT CHANGE UP (ref 32–36)
MCV RBC AUTO: 94.5 FL — SIGNIFICANT CHANGE UP (ref 80–100)
MONOCYTES # BLD AUTO: 0.7 K/UL — SIGNIFICANT CHANGE UP (ref 0–0.9)
MONOCYTES NFR BLD AUTO: 8.4 % — SIGNIFICANT CHANGE UP (ref 2–14)
NEUTROPHILS # BLD AUTO: 4.91 K/UL — SIGNIFICANT CHANGE UP (ref 1.8–7.4)
NEUTROPHILS NFR BLD AUTO: 59.2 % — SIGNIFICANT CHANGE UP (ref 43–77)
NRBC # FLD: 0 K/UL — SIGNIFICANT CHANGE UP (ref 0–0)
PHOSPHATE SERPL-MCNC: 4.6 MG/DL — HIGH (ref 2.5–4.5)
PLATELET # BLD AUTO: 234 K/UL — SIGNIFICANT CHANGE UP (ref 150–400)
PMV BLD: 8.8 FL — SIGNIFICANT CHANGE UP (ref 7–13)
POTASSIUM SERPL-MCNC: 4.1 MMOL/L — SIGNIFICANT CHANGE UP (ref 3.5–5.3)
POTASSIUM SERPL-SCNC: 4.1 MMOL/L — SIGNIFICANT CHANGE UP (ref 3.5–5.3)
RBC # BLD: 4.75 M/UL — SIGNIFICANT CHANGE UP (ref 4.2–5.8)
RBC # FLD: 12.4 % — SIGNIFICANT CHANGE UP (ref 10.3–14.5)
SODIUM SERPL-SCNC: 136 MMOL/L — SIGNIFICANT CHANGE UP (ref 135–145)
TRIGL SERPL-MCNC: 89 MG/DL — SIGNIFICANT CHANGE UP (ref 10–149)
TROPONIN T, HIGH SENSITIVITY: < 6 NG/L — SIGNIFICANT CHANGE UP (ref ?–14)
TSH SERPL-MCNC: 1.43 UIU/ML — SIGNIFICANT CHANGE UP (ref 0.27–4.2)
WBC # BLD: 8.29 K/UL — SIGNIFICANT CHANGE UP (ref 3.8–10.5)
WBC # FLD AUTO: 8.29 K/UL — SIGNIFICANT CHANGE UP (ref 3.8–10.5)

## 2019-04-06 RX ORDER — LOSARTAN POTASSIUM 100 MG/1
50 TABLET, FILM COATED ORAL DAILY
Qty: 0 | Refills: 0 | Status: DISCONTINUED | OUTPATIENT
Start: 2019-04-06 | End: 2019-04-09

## 2019-04-06 RX ORDER — BUPROPION HYDROCHLORIDE 150 MG/1
300 TABLET, EXTENDED RELEASE ORAL DAILY
Qty: 0 | Refills: 0 | Status: DISCONTINUED | OUTPATIENT
Start: 2019-04-06 | End: 2019-04-12

## 2019-04-06 RX ORDER — HEPARIN SODIUM 5000 [USP'U]/ML
5000 INJECTION INTRAVENOUS; SUBCUTANEOUS EVERY 12 HOURS
Qty: 0 | Refills: 0 | Status: DISCONTINUED | OUTPATIENT
Start: 2019-04-06 | End: 2019-04-11

## 2019-04-06 RX ORDER — VENLAFAXINE HCL 75 MG
150 CAPSULE, EXT RELEASE 24 HR ORAL
Qty: 0 | Refills: 0 | Status: DISCONTINUED | OUTPATIENT
Start: 2019-04-06 | End: 2019-04-12

## 2019-04-06 RX ORDER — ATORVASTATIN CALCIUM 80 MG/1
40 TABLET, FILM COATED ORAL AT BEDTIME
Qty: 0 | Refills: 0 | Status: DISCONTINUED | OUTPATIENT
Start: 2019-04-06 | End: 2019-04-12

## 2019-04-06 NOTE — ED PROVIDER NOTE - PROGRESS NOTE DETAILS
discussed with EP - no meds to be added right now, consider low dose betablocker if happens inpatient.

## 2019-04-06 NOTE — ED PROVIDER NOTE - NS ED ROS FT
CONSTITUTIONAL: No fever, no chills  EYES: No eye pain, no visual disturbance  Mouth: no pain in mouth, no cuts  RESPIRATORY: No cough, No sob  CARDIOVASCULAR: No CP, no palpitations  GASTROINTESTINAL: no abdominal pain, no n/v/d  GENITOURINARY: No dysuria, no hematuria  NEUROLOGICAL: No headaches, dizziness+  SKIN: No itching, no rashes  MUSCULOSKELETAL: No joint pain, no joint swelling

## 2019-04-06 NOTE — ED PROVIDER NOTE - OBJECTIVE STATEMENT
59M w/ hx of vertigo w/ recent ED w/u with unclear etiology, HTN, HLD now presenting with dizziness and report of SVT on holter monitor. Patient reports feeling dizzy this morning without chest pain, palpitations, sob, n/v/d and thereafter received call notifying that holter monitor identified SVT to 225bpm. Patient discussed with his doctor who instructed to come to ER.

## 2019-04-06 NOTE — CONSULT NOTE ADULT - SUBJECTIVE AND OBJECTIVE BOX
Date of Admission: 2019    CHIEF COMPLAINT: dizziness    HISTORY OF PRESENT ILLNESS: 59M with HTN, HLD, BPH, ROSIO, anxiety, recurrent syncope, and s/p gastric sleeve in  sent in today by cardiologist for report of 25B NSVT noted on event monitor. Patient reports a h/o intermittent dizziness, initially thought to be vertigo but now just dizziness. Episodes last approximately 30 sec, denies any palpitations, chest pain, or SOB. Most recently patient was evaluated in ER for syncopal episode associated with urination and workup was negative with ECHO showing nl LV function, EF 64%, and mod AS. His cardiologist repeated an outpatient workup with echo and stress which patient was told was normal. The only concern he stated was some calcification in the aorta. He was given an event monitor. This am he experienced an episode of dizziness which lasted about 30 sec but without syncope. Later he was contacted by his cardiologist instructing him to come to ER as event monitor revealed 25B NSVT at 225 bpm, unfortunately strips are not available at this time for review.       Allergies    penicillin (Hives)  tetracycline (Angioedema)    Intolerances    History of alcohol abuse (Other)  	    MEDICATIONS:    PAST MEDICAL & SURGICAL HISTORY:  Vertigo  Osteoarthritis (arthritis due to wear and tear of joints)  Glaucoma  Anxiety  ROSIO (obstructive sleep apnea)  Degenerative disc disease, cervical  GERD (gastroesophageal reflux disease)  Essential hypertension  Dyslipidemia  Depression  BPH (benign prostatic hypertrophy)  Morbid obesity due to excess calories  H/O lipoma: h/o excision lipoma back  H/O pilonidal cyst: h/o excision plilonidal cyst  H/O melanoma excision  H/O hernia repair  H/O laparoscopic adjustable gastric bandin- Defalted in 2017      FAMILY HISTORY:  No pertinent family history in first degree relatives      SOCIAL HISTORY:    Smoker  denies  Alcohol denies  Drugs denies      REVIEW OF SYSTEMS:  See HPI. Otherwise, 10 point ROS done and otherwise negative.    PHYSICAL EXAM:  T(C): 36.7 (19 @ 17:45), Max: 37 (19 @ 15:55)  HR: 74 (19 @ 17:45) (74 - 92)  BP: 159/86 (19 @ 17:45) (135/83 - 159/86)  RR: 16 (19 @ 17:45) (16 - 18)  SpO2: 98% (19 @ 17:45) (98% - 98%)  Wt(kg): --  I&O's Summary      Appearance: NAD, skin W & D  HEENT:   Normal oral mucosa, PERRL, EOMI	  Cardiovascular: nl S1S2 with 3/6 MALU noted  Respiratory: CTA B/L	  Psychiatry: A & O x 3, mildly anxious  Gastrointestinal:  Soft, Non-tender, + BS	  Skin: No rashes, No ecchymoses, No cyanosis	  Neurologic: Non-focal  Extremities: tr LE edema which patient is residual from ablation procedure for circulation.          LABS:	 	                        15.0   8.29  )-----------( 234      ( 2019 18:05 )             44.9       -    136  |  101  |  20  ----------------------------<  103<H>  4.1   |  22  |  0.84    Ca    9.3      2019 18:05  Phos  4.6       Mg     2.0     -      TSH: Thyroid Stimulating Hormone, Serum: 1.43 uIU/mL ( @ 18:05)      CARDIAC MARKERS:  Troponin T, High Sensitivity: < 6 ng/L (19 @ 18:05)      TELEMETRY: 	NSR      ECG:  NSR	      PREVIOUS DIAGNOSTIC TESTING:    [ ] Echocardiogram:    Patient name: DRAKE RODRIGUEZ  YOB: 1960   Age: 59 (M)   MR#: [id number]  Study Date: 3/21/2019  Location: O/PSonographer: Monisha Ac  Study quality: Technically Fair  Referring Physician: Not Available Doctor, MD  Blood Pressure:122/66 mmHg  Height: 178 cm  Weight: 111 kg  BSA: 2.3 m2  ------------------------------------------------------------------------  PROCEDURE: Transthoracic echocardiogram with 2-D, M-Mode  and complete spectral and color flow Doppler.  INDICATION: Syncope and collapse (R55)  ------------------------------------------------------------------------  DIMENSIONS:  Dimensions:     Normal Values:  LA:     3.5 cm    2.0 - 4.0 cm  Ao:     3.1 cm    2.0 - 3.8 cm  SEPTUM: 0.8 cm    0.6 - 1.2 cm  PWT:    0.7 cm    0.6 - 1.1 cm  LVIDd:  5.1 cm    3.0 - 5.6 cm  LVIDs:  3.3 cm    1.8 - 4.0 cm  Derived Variables:  LVMI: 57 g/m2  RWT: 0.27  Fractional short: 35 %  Ejection Fraction (Teicholtz): 64 %  ------------------------------------------------------------------------  OBSERVATIONS:  Mitral Valve: Mitral annular calcification, otherwise  normal mitral valve. Minimal mitral regurgitation.  Aortic Root: Normal aortic root.  Aortic Valve: Aortic valve leaflet morphology not well  visualized.  The valveis calcified. Peak transaortic valve  gradient equals 40 mm Hg, mean transaortic valve gradient  equals 22 mm Hg, consistent with probable moderate aortic  stenosis.  Left Atrium: Normal left atrium.  Left Ventricle: Endocardium not well visualized; grossly  normal left ventricular systolic function. Normal left  ventricular internal dimensions and wall thicknesses.  Right Heart: Normal right atrium. The right ventricle is  not well visualized; grossly normal right ventricular  systolic function.Normal tricuspid valve.  Minimal  tricuspid regurgitation. Normal pulmonic valve.  Pericardium/PleuraNormal pericardium with no pericardial  effusion.  ------------------------------------------------------------------------  CONCLUSIONS:  1. Mitral annular calcification, otherwise normal mitral  valve. Minimal mitral regurgitation.  2. Aortic valve leaflet morphology not well visualized.  The valve is calcified. Peak transaortic valve gradient  equals 40 mm Hg, mean transaortic valve gradient equals 22  mm Hg, consistent with probable moderate aortic stenosis.  3. Normal left ventricular internal dimensions and wall  thicknesses.  4. Endocardium not well visualized; grossly normal left  ventricular systolic function.  5. The right ventricle is not well visualized; grossly  normal right ventricular systolic function.  ------------------------------------------------------------------------  Confirmed on  3/21/2019 - 16:11:53 by Ronal Abrams M.D.  ------------------------------------------------------------------------

## 2019-04-06 NOTE — ED ADULT TRIAGE NOTE - CHIEF COMPLAINT QUOTE
Pt is wearing a holter monitor and today he had a dizzy spell after which the holter monitor company called him and said he had an arrhythmia to go to ER--pt was in SVT at 223 rate  Pt denies symptoms at present

## 2019-04-06 NOTE — ED PROVIDER NOTE - CLINICAL SUMMARY MEDICAL DECISION MAKING FREE TEXT BOX
59M w/ hx of vertigo w/ recent ED w/u with unclear etiology, HTN, HLD now presenting with dizziness and report of SVT on holter monitor now requiring further w/u and tele evaluation. sent lab including trop, on tele w/ NSR normal rate. Will reach out to patients cardiologist regarding admit.

## 2019-04-06 NOTE — CONSULT NOTE ADULT - ASSESSMENT
59M with HTN, HLD, BPH, ROSIO, anxiety, recurrent syncope, and s/p gastric sleeve in 2004 sent in today by cardiologist for report of 25B NSVT noted on event monitor associated with dizziness.    D/W Dr. Casiano, EP attending: admit to telemetry, obtain most recent outpatient records including event monitor documentation of NSVT. Monitor at present off any antiarrhythmic  meds but if arrhythmia recurs can add low dose metoprolol.  Maintain K > 4.0, Mg > 2.0. EP service will follow.

## 2019-04-06 NOTE — ED PROVIDER NOTE - PHYSICAL EXAMINATION
GENERAL: NAD, well-developed  HEAD:  Atraumatic, Normocephalic  EYES: EOMI, PERRLA, conjunctiva and sclera clear  Mouth: MMM, no lesions  NECK: Supple, no appreciable masses  Lung: normal work of breathing, cta b/l  Chest: S1&S2+, rrr, no m/r/g appreciated  ABDOMEN: bs+, soft, nt, nd, no appreciable masses  : No khalil catheter, no CVA tenderness  EXTREMITIES:  radial pulse present b/l, PT present b/l, no pitting edema present  Neuro: Alert and appropriate to conversation, no focal deficits  SKIN: warm and dry, no visible rashes

## 2019-04-06 NOTE — ED PROVIDER NOTE - ATTENDING CONTRIBUTION TO CARE
Valenzuela: 59 yom with dizziness episode lasting 30 seconds today while on Holter monitor. Called by cardiologist Dr. Terrence Vargas to come to ED. Pt has syncopal episode few weeks and during workup had Holter placed. Today pressed button got called that he had V tachy 25 beats HR 225bpm at that time. No syncope. On exam, pt is well appearing, HR 75 sinus. +murmur, no jvd, no pitting edema. Spoke with Dr. Vargas, EP called. tele admit.

## 2019-04-06 NOTE — ED ADULT NURSE NOTE - OBJECTIVE STATEMENT
59 year old male presents today after a "dizzy spell" Pt is wearing a halter monitor and had a "dizzy spell" today and the halter monitor company notified the pt to go to the ER  Pt was told he had an episode of SVT now resolved. PIV placed, labs drawn and sent pt denies CP or SOB cardiac monitor on RSR noted   Plan of care discussed with pt will continue to monitor closely 59 year old male presents today after a "dizzy spell" Pt is wearing a halter monitor and had a "dizzy spell" today and the halter monitor company notified the pt to go to the ER  Pt had a 23 beat run of VT.  PIV placed, labs drawn and sent pt denies CP or SOB cardiac monitor on RSR noted   Plan of care discussed with pt will continue to monitor closely

## 2019-04-06 NOTE — CONSULT NOTE ADULT - SUBJECTIVE AND OBJECTIVE BOX
Patient is a 59y old  Male who presents with a chief complaint of NSVT (2019 19:07)      HPI: 59 year old male with HX of Obesity ,HTN , HLD , ROSIO , Anxiety /depression & BPH . Patient with recurrent dizziness over past 5 years with one episode of syncope recently while urinating . He is S/p Gastric Sleeve 2 years ago with 100 Lbs WT loss , Recent echo with Moderate AS . . Patient was seen as out-pt with Dr Packer for recurrent dizziness , Cardiac w/u reported negative .This AM while pt on monitoring service , he triggered an event which was associated with dizziness ,tracing with NSVT @ 240 bpm ,7 second , around 25 beats ,   patient susequently sent to Er for further evaluation , He is off Flomax since last week . denies Cp ,Sob ,Palpitation ,Pedal edema , PND or ESQUIVEL     PAST MEDICAL & SURGICAL HISTORY:  Vertigo  Osteoarthritis (arthritis due to wear and tear of joints)  Glaucoma  Anxiety  ROSIO (obstructive sleep apnea)  Degenerative disc disease, cervical  GERD (gastroesophageal reflux disease)  Essential hypertension  Dyslipidemia  Depression  BPH (benign prostatic hypertrophy)  Morbid obesity due to excess calories  H/O lipoma: h/o excision lipoma back  H/O pilonidal cyst: h/o excision plilonidal cyst  H/O melanoma excision  H/O hernia repair  H/O laparoscopic adjustable gastric bandin- Defalted in 2017      Allergies    penicillin (Hives)  tetracycline (Angioedema)    Intolerances    History of alcohol abuse (Other)    MEDICATIONS  (STANDING):  Olmesartan 20 mg daily   Lipitor 40 mg qhs   Effexor   Butalbital     MEDICATIONS  (PRN):      Vital Signs Last 24 Hrs  T(C): 36.6 (2019 20:44), Max: 37 (2019 15:55)  T(F): 97.9 (2019 20:44), Max: 98.6 (2019 15:55)  HR: 74 (2019 20:44) (74 - 92)  BP: 101/46 (2019 20:44) (101/46 - 159/86)  BP(mean): --  RR: 18 (2019 20:44) (16 - 18)  SpO2: 98% (2019 20:44) (98% - 99%)  Daily     Daily   I&O's Detail      PHYSICAL EXAM: obese male in NAd   Eyes: Anicteric , no injection   Neck: No sig JVD   Lungs:  CTA   Cor:  SM 3/6   Abd: obese NT + BS   Ext: denies C/C/e  Pulses: +2   Neuro: AxoX3    LABS          CBC Full  -  ( 2019 18:05 )  WBC Count : 8.29 K/uL  RBC Count : 4.75 M/uL  Hemoglobin : 15.0 g/dL  Hematocrit : 44.9 %  Platelet Count - Automated : 234 K/uL  Mean Cell Volume : 94.5 fL  Mean Cell Hemoglobin : 31.6 pg  Mean Cell Hemoglobin Concentration : 33.4 %  Auto Neutrophil # : 4.91 K/uL  Auto Lymphocyte # : 2.41 K/uL  Auto Monocyte # : 0.70 K/uL  Auto Eosinophil # : 0.17 K/uL  Auto Basophil # : 0.06 K/uL  Auto Neutrophil % : 59.2 %  Auto Lymphocyte % : 29.1 %  Auto Monocyte % : 8.4 %  Auto Eosinophil % : 2.1 %  Auto Basophil % : 0.7 %    -    136  |  101  |  20  ----------------------------<  103<H>  4.1   |  22  |  0.84    Ca    9.3      2019 18:05  Phos  4.6     04-06  Mg     2.0     04-06      Lipid wkode10-28 @ 18:05  209  115  95  --  89    TSH:Thyroid Stimulating Hormone, Serum: 1.43 uIU/mL (- @ 18:05)    Digoxin level    ECHO  CONCLUSIONS:3/21/19  1. Mitral annular calcification, otherwise normal mitral  valve. Minimal mitral regurgitation.  2. Aortic valve leaflet morphology not well visualized.  The valve is calcified. Peak transaortic valve gradient  equals 40 mm Hg, mean transaortic valve gradient equals 22  mm Hg, consistent with probable moderate aortic stenosis.  3. Normal left ventricular internal dimensions and wall  thicknesses.  4. Endocardium not well visualized; grossly normal left  ventricular systolic function.  5. The right ventricle is not well visualized; grossly  normal right ventricular systolic function.      Nuclear Stress    ECG: Sr @ 82 bpm . qtc 425 ms , poor R wave progression     TELEMETRY: Sr @ 70 bpm     RADIOLOGY & ADDITIONAL STUDIES:    ASSESSMENT & PLAN:  59 year old male with HX of Obesity ,HTN , HLD , ROSIO , Anxiety /depression & BPH . Patient with recurrent dizziness over past 5 years with one episode of syncope recently while urinating . He is S/p Gastric Sleeve 2 years ago with 100 Lbs WT loss , Recent echo with Moderate AS . . Patient was seen as out-pt with Dr Packer for recurrent dizziness , Cardiac w/u reported negative .This AM while pt on monitoring service , he triggered an event which was associated with dizziness ,tracing with NSVT @ 240 bpm ,7 second , around 25 beats ,   patient susequently sent to Er for further evaluation , He is off Flomax since last week . denies Cp ,Sob ,Palpitation ,Pedal edema , PND or ESQUIVEL     1. NSVT Symptomatic , / recurrent dizziness / one episode of Syncope   off Flomax   NSVT 7 sec . 240 bpm        NL electrolytes      EP consult appreciated      EKG SR   continue Tele   Tracing was Faxed  to the office        2. HTN   continue Olmesartan 20 mg daily      3. HLD   Continue Liptor 40 qhs        4. Anxiety /depression     Continue Effexor  150 BID  & Bupropione 300 daily       5 Aortic Stenosis        Moderate       6. DVT prophylaxis

## 2019-04-07 DIAGNOSIS — I47.2 VENTRICULAR TACHYCARDIA: ICD-10-CM

## 2019-04-07 DIAGNOSIS — F41.9 ANXIETY DISORDER, UNSPECIFIED: ICD-10-CM

## 2019-04-07 DIAGNOSIS — Z29.9 ENCOUNTER FOR PROPHYLACTIC MEASURES, UNSPECIFIED: ICD-10-CM

## 2019-04-07 DIAGNOSIS — E78.5 HYPERLIPIDEMIA, UNSPECIFIED: ICD-10-CM

## 2019-04-07 DIAGNOSIS — I10 ESSENTIAL (PRIMARY) HYPERTENSION: ICD-10-CM

## 2019-04-07 LAB
ANION GAP SERPL CALC-SCNC: 9 MMO/L — SIGNIFICANT CHANGE UP (ref 7–14)
BUN SERPL-MCNC: 17 MG/DL — SIGNIFICANT CHANGE UP (ref 7–23)
CALCIUM SERPL-MCNC: 9 MG/DL — SIGNIFICANT CHANGE UP (ref 8.4–10.5)
CHLORIDE SERPL-SCNC: 105 MMOL/L — SIGNIFICANT CHANGE UP (ref 98–107)
CO2 SERPL-SCNC: 25 MMOL/L — SIGNIFICANT CHANGE UP (ref 22–31)
CREAT SERPL-MCNC: 0.74 MG/DL — SIGNIFICANT CHANGE UP (ref 0.5–1.3)
GLUCOSE SERPL-MCNC: 91 MG/DL — SIGNIFICANT CHANGE UP (ref 70–99)
HBA1C BLD-MCNC: 4.8 % — SIGNIFICANT CHANGE UP (ref 4–5.6)
HCT VFR BLD CALC: 46.3 % — SIGNIFICANT CHANGE UP (ref 39–50)
HCV AB S/CO SERPL IA: 0.15 S/CO — SIGNIFICANT CHANGE UP (ref 0–0.99)
HCV AB SERPL-IMP: SIGNIFICANT CHANGE UP
HGB BLD-MCNC: 15.7 G/DL — SIGNIFICANT CHANGE UP (ref 13–17)
HIV 1+2 AB+HIV1 P24 AG SERPL QL IA: SIGNIFICANT CHANGE UP
MAGNESIUM SERPL-MCNC: 2 MG/DL — SIGNIFICANT CHANGE UP (ref 1.6–2.6)
MCHC RBC-ENTMCNC: 32 PG — SIGNIFICANT CHANGE UP (ref 27–34)
MCHC RBC-ENTMCNC: 33.9 % — SIGNIFICANT CHANGE UP (ref 32–36)
MCV RBC AUTO: 94.3 FL — SIGNIFICANT CHANGE UP (ref 80–100)
NRBC # FLD: 0 K/UL — SIGNIFICANT CHANGE UP (ref 0–0)
PLATELET # BLD AUTO: 205 K/UL — SIGNIFICANT CHANGE UP (ref 150–400)
PMV BLD: 8.9 FL — SIGNIFICANT CHANGE UP (ref 7–13)
POTASSIUM SERPL-MCNC: 4 MMOL/L — SIGNIFICANT CHANGE UP (ref 3.5–5.3)
POTASSIUM SERPL-SCNC: 4 MMOL/L — SIGNIFICANT CHANGE UP (ref 3.5–5.3)
RBC # BLD: 4.91 M/UL — SIGNIFICANT CHANGE UP (ref 4.2–5.8)
RBC # FLD: 12.6 % — SIGNIFICANT CHANGE UP (ref 10.3–14.5)
SODIUM SERPL-SCNC: 139 MMOL/L — SIGNIFICANT CHANGE UP (ref 135–145)
WBC # BLD: 5.9 K/UL — SIGNIFICANT CHANGE UP (ref 3.8–10.5)
WBC # FLD AUTO: 5.9 K/UL — SIGNIFICANT CHANGE UP (ref 3.8–10.5)

## 2019-04-07 RX ORDER — CYCLOBENZAPRINE HYDROCHLORIDE 10 MG/1
1 TABLET, FILM COATED ORAL
Qty: 0 | Refills: 0 | COMMUNITY

## 2019-04-07 RX ORDER — SODIUM CHLORIDE 9 MG/ML
3 INJECTION INTRAMUSCULAR; INTRAVENOUS; SUBCUTANEOUS EVERY 8 HOURS
Qty: 0 | Refills: 0 | Status: DISCONTINUED | OUTPATIENT
Start: 2019-04-07 | End: 2019-04-12

## 2019-04-07 RX ORDER — TAMSULOSIN HYDROCHLORIDE 0.4 MG/1
1 CAPSULE ORAL
Qty: 0 | Refills: 0 | COMMUNITY

## 2019-04-07 RX ADMIN — BUPROPION HYDROCHLORIDE 300 MILLIGRAM(S): 150 TABLET, EXTENDED RELEASE ORAL at 12:33

## 2019-04-07 RX ADMIN — Medication 150 MILLIGRAM(S): at 03:30

## 2019-04-07 RX ADMIN — HEPARIN SODIUM 5000 UNIT(S): 5000 INJECTION INTRAVENOUS; SUBCUTANEOUS at 06:58

## 2019-04-07 RX ADMIN — SODIUM CHLORIDE 3 MILLILITER(S): 9 INJECTION INTRAMUSCULAR; INTRAVENOUS; SUBCUTANEOUS at 06:56

## 2019-04-07 RX ADMIN — SODIUM CHLORIDE 3 MILLILITER(S): 9 INJECTION INTRAMUSCULAR; INTRAVENOUS; SUBCUTANEOUS at 14:11

## 2019-04-07 RX ADMIN — ATORVASTATIN CALCIUM 40 MILLIGRAM(S): 80 TABLET, FILM COATED ORAL at 21:31

## 2019-04-07 RX ADMIN — SODIUM CHLORIDE 3 MILLILITER(S): 9 INJECTION INTRAMUSCULAR; INTRAVENOUS; SUBCUTANEOUS at 21:28

## 2019-04-07 RX ADMIN — Medication 150 MILLIGRAM(S): at 18:48

## 2019-04-07 RX ADMIN — LOSARTAN POTASSIUM 50 MILLIGRAM(S): 100 TABLET, FILM COATED ORAL at 06:57

## 2019-04-07 NOTE — H&P ADULT - HISTORY OF PRESENT ILLNESS
58 y/o Male with PMH of anxiety, depression, BPH, dyslipidemia and GERD presents to the ED complaining of dizziness and SVT on holter monitor. Patient states this morning he had an episode of dizziness that lasted for approximately 30 seconds. Patient states that he received a call shortly after from the monitor company and then shortly after he received a called from his cardiologist to go the ED. Holter monitor identifed SVT to 225 bpm. Patient currently denies chest pain, shortness of breath, dizziness, fever, chills, nausea, vomiting, diarrhea. Patient states he feels fine now.  Patient admit to being recently admitted for episode of dizziness in which he fell.

## 2019-04-07 NOTE — H&P ADULT - NSHPLABSRESULTS_GEN_ALL_CORE
Vital Signs Last 24 Hrs  T(C): 36.6 (06 Apr 2019 20:44), Max: 37 (06 Apr 2019 15:55)  T(F): 97.9 (06 Apr 2019 20:44), Max: 98.6 (06 Apr 2019 15:55)  HR: 74 (06 Apr 2019 20:44) (74 - 92)  BP: 101/46 (06 Apr 2019 20:44) (101/46 - 159/86)  BP(mean): --  RR: 18 (06 Apr 2019 20:44) (16 - 18)  SpO2: 98% (06 Apr 2019 20:44) (98% - 99%)                          15.0   8.29  )-----------( 234      ( 06 Apr 2019 18:05 )             44.9   Troponin <6  Na 136, K 4.1  AG 13, BUN 20  Cr 0.84  Cholesterol 209  Phosphorus 4.6.    3/21/19 TTE: EF 64%  1. Mitral annular calcification, otherwise normal mitral  valve. Minimal mitral regurgitation.  2. Aortic valve leaflet morphology not well visualized.  The valve is calcified. Peak transaortic valve gradient  equals 40 mm Hg, mean transaortic valve gradient equals 22  mm Hg, consistent with probable moderate aortic stenosis.  3. Normal left ventricular internal dimensions and wall  thicknesses.  4. Endocardium not well visualized; grossly normal left  ventricular systolic function.  5. The right ventricle is not well visualized; grossly  normal right ventricular systolic function.

## 2019-04-07 NOTE — H&P ADULT - PROBLEM SELECTOR PLAN 1
Admit to tele, continue monitoring, EP consult appreciated, per EP consult monitor off antiarrhythmic meds. If arrhythmia recurs add low dose metoprolol. Maintain K> 4.0, Mg>2.0.

## 2019-04-07 NOTE — H&P ADULT - NSICDXPASTSURGICALHX_GEN_ALL_CORE_FT
PAST SURGICAL HISTORY:  H/O hernia repair     H/O laparoscopic adjustable gastric banding 2003- Defalted in 01/2017    H/O lipoma h/o excision lipoma back    H/O melanoma excision     H/O pilonidal cyst h/o excision plilonidal cyst

## 2019-04-07 NOTE — H&P ADULT - NSHPSOCIALHISTORY_GEN_ALL_CORE
Patient works as  for [employer]. Patient denies the use of alcohol and say he is now sober. Patient admits to smoking a cigar occasionally.

## 2019-04-07 NOTE — H&P ADULT - RS GEN PE MLT RESP DETAILS PC
no intercostal retractions/respirations non-labored/clear to auscultation bilaterally/breath sounds equal

## 2019-04-07 NOTE — H&P ADULT - ASSESSMENT
58 y/o Male with PMH of anxiety, depression, BPH, dyslipidemia and GERD presents to the ED complaining of dizziness and SVT on Holter monitor.     EP consult:59M with HTN, HLD, BPH, ROSIO, anxiety, recurrent syncope, and s/p gastric sleeve in 2004 sent in today by cardiologist for report of 25B NSVT noted on event monitor associated with dizziness.    D/W Dr. Casiano, EP attending: admit to telemetry, obtain most recent outpatient records including event monitor documentation of NSVT. Monitor at present off any antiarrhythmic  meds but if arrhythmia recurs can add low dose metoprolol.  Maintain K > 4.0, Mg > 2.0. EP service will follow.     Cardiology: 1. NSVT Symptomatic , / recurrent dizziness / one episode of Syncope   off Flomax   NSVT 7 sec . 240 bpm        NL electrolytes      EP consult appreciated      EKG SR   continue Tele   Tracing was Faxed  to the office    2. HTN   continue Olmesartan 20 mg daily    3. HLD   Continue Liptor 40 qhs      4. Anxiety /depression     Continue Effexor  150 BID  & Bupropione 300 daily     5 Aortic Stenosis        Moderate     6. DVT prophylaxis 58 y/o Male with PMH of anxiety, depression, BPH, dyslipidemia and GERD presents to the ED complaining of dizziness and SVT on Holter monitor. See cardiology note.    EP consult:59M with HTN, HLD, BPH, ROSIO, anxiety, recurrent syncope, and s/p gastric sleeve in 2004 sent in today by cardiologist for report of 25B NSVT noted on event monitor associated with dizziness.    D/W Dr. Casiano, EP attending: admit to telemetry, obtain most recent outpatient records including event monitor documentation of NSVT. Monitor at present off any antiarrhythmic  meds but if arrhythmia recurs can add low dose metoprolol.  Maintain K > 4.0, Mg > 2.0. EP service will follow.     Cardiology: 1. NSVT Symptomatic , / recurrent dizziness / one episode of Syncope   off Flomax   NSVT 7 sec . 240 bpm        NL electrolytes      EP consult appreciated      EKG SR   continue Tele   Tracing was Faxed  to the office    2. HTN   continue Olmesartan 20 mg daily    3. HLD   Continue Liptor 40 qhs      4. Anxiety /depression     Continue Effexor  150 BID  & Bupropione 300 daily     5 Aortic Stenosis        Moderate     6. DVT prophylaxis

## 2019-04-07 NOTE — H&P ADULT - NSICDXPASTMEDICALHX_GEN_ALL_CORE_FT
PAST MEDICAL HISTORY:  Anxiety     BPH (benign prostatic hypertrophy)     Degenerative disc disease, cervical     Depression     Dyslipidemia     Essential hypertension     GERD (gastroesophageal reflux disease)     Glaucoma     Morbid obesity due to excess calories     ROSIO (obstructive sleep apnea)     Osteoarthritis (arthritis due to wear and tear of joints)     Vertigo

## 2019-04-07 NOTE — PROGRESS NOTE ADULT - SUBJECTIVE AND OBJECTIVE BOX
FOLLOW UP VISIT:  SYMPTOMS:  Patient comfortable   denies Cp ,Sob ,Dizziness or Palpitation     MEDICATIONS  (STANDING):  atorvastatin 40 milliGRAM(s) Oral at bedtime  buPROPion XL . 300 milliGRAM(s) Oral daily  heparin  Injectable 5000 Unit(s) SubCutaneous every 12 hours  losartan 50 milliGRAM(s) Oral daily  sodium chloride 0.9% lock flush 3 milliLiter(s) IV Push every 8 hours  venlafaxine XR. 150 milliGRAM(s) Oral two times a day    MEDICATIONS  (PRN):      Vital Signs Last 24 Hrs  T(C): 36.7 (07 Apr 2019 17:22), Max: 36.7 (07 Apr 2019 17:22)  T(F): 98 (07 Apr 2019 17:22), Max: 98 (07 Apr 2019 17:22)  HR: 66 (07 Apr 2019 17:22) (58 - 83)  BP: 134/68 (07 Apr 2019 17:22) (101/46 - 144/80)  BP(mean): --  RR: 17 (07 Apr 2019 17:22) (17 - 18)  SpO2: 98% (07 Apr 2019 17:22) (97% - 99%)    Daily     Daily     I&O's Detail      Physical Exam  : obese male in NAd   Eyes: Anicteric , no injection   Neck: No sig JVD   Lungs:  CTA   Cor:  SM 3/6   Abd: obese NT + BS   Ext: denies C/C/e  Pulses: +2   Neuro: AxoX3        LABS          CBC Full  -  ( 07 Apr 2019 06:10 )  WBC Count : 5.90 K/uL  RBC Count : 4.91 M/uL  Hemoglobin : 15.7 g/dL  Hematocrit : 46.3 %  Platelet Count - Automated : 205 K/uL  Mean Cell Volume : 94.3 fL  Mean Cell Hemoglobin : 32.0 pg  Mean Cell Hemoglobin Concentration : 33.9 %  Auto Neutrophil # : x  Auto Lymphocyte # : x  Auto Monocyte # : x  Auto Eosinophil # : x  Auto Basophil # : x  Auto Neutrophil % : x  Auto Lymphocyte % : x  Auto Monocyte % : x  Auto Eosinophil % : x  Auto Basophil % : x    04-07    139  |  105  |  17  ----------------------------<  91  4.0   |  25  |  0.74    Ca    9.0      07 Apr 2019 06:10  Phos  4.6     04-06  Mg     2.0     04-07      Lipid panel  TSH:  Digoxin level    ECHO    Nuclear Stress    ECG:    Telemetry: SR @ 70 bpm     RADIOLOGY & ADDITIONAL STUDIES:    ASSESSMENT & PLAN:   59 year old male with HX of Obesity ,HTN , HLD , ROSIO , Anxiety /depression & BPH . Patient with recurrent dizziness over past 5 years with one episode of syncope recently while urinating . He is S/p Gastric Sleeve 2 years ago with 100 Lbs WT loss , Recent echo with Moderate AS . . Patient was seen as out-pt with Dr Packer for recurrent dizziness , Cardiac w/u reported negative .This AM while pt on monitoring service , he triggered an event which was associated with dizziness ,tracing with NSVT @ 240 bpm ,7 second , around 25 beats ,   patient susequently sent to Er for further evaluation , He is off Flomax since last week . denies Cp ,Sob ,Palpitation ,Pedal edema , PND or ESQUIVEL     1. NSVT Symptomatic , / recurrent dizziness / one episode of Syncope   off Flomax   NSVT 7 sec . 240 bpm        NL electrolytes   recent cardiac w/u in office      EP consult appreciated     Tele  SR   continue Tele          2. HTN   continue Losartan 50 mg daily      3. HLD   Continue Liptor 40 qhs        4. Anxiety /depression     Continue Effexor  150 BID  & Bupropione 300 daily       5 Aortic Stenosis        Moderate       6. DVT prophylaxis

## 2019-04-08 PROCEDURE — 99233 SBSQ HOSP IP/OBS HIGH 50: CPT

## 2019-04-08 RX ORDER — METOPROLOL TARTRATE 50 MG
25 TABLET ORAL
Qty: 0 | Refills: 0 | Status: DISCONTINUED | OUTPATIENT
Start: 2019-04-08 | End: 2019-04-10

## 2019-04-08 RX ADMIN — SODIUM CHLORIDE 3 MILLILITER(S): 9 INJECTION INTRAMUSCULAR; INTRAVENOUS; SUBCUTANEOUS at 13:14

## 2019-04-08 RX ADMIN — HEPARIN SODIUM 5000 UNIT(S): 5000 INJECTION INTRAVENOUS; SUBCUTANEOUS at 17:30

## 2019-04-08 RX ADMIN — ATORVASTATIN CALCIUM 40 MILLIGRAM(S): 80 TABLET, FILM COATED ORAL at 21:32

## 2019-04-08 RX ADMIN — Medication 150 MILLIGRAM(S): at 05:36

## 2019-04-08 RX ADMIN — BUPROPION HYDROCHLORIDE 300 MILLIGRAM(S): 150 TABLET, EXTENDED RELEASE ORAL at 13:13

## 2019-04-08 RX ADMIN — LOSARTAN POTASSIUM 50 MILLIGRAM(S): 100 TABLET, FILM COATED ORAL at 05:36

## 2019-04-08 RX ADMIN — HEPARIN SODIUM 5000 UNIT(S): 5000 INJECTION INTRAVENOUS; SUBCUTANEOUS at 05:36

## 2019-04-08 RX ADMIN — Medication 25 MILLIGRAM(S): at 21:32

## 2019-04-08 RX ADMIN — SODIUM CHLORIDE 3 MILLILITER(S): 9 INJECTION INTRAMUSCULAR; INTRAVENOUS; SUBCUTANEOUS at 21:33

## 2019-04-08 RX ADMIN — Medication 150 MILLIGRAM(S): at 17:30

## 2019-04-08 RX ADMIN — SODIUM CHLORIDE 3 MILLILITER(S): 9 INJECTION INTRAMUSCULAR; INTRAVENOUS; SUBCUTANEOUS at 05:35

## 2019-04-08 NOTE — PROGRESS NOTE ADULT - SUBJECTIVE AND OBJECTIVE BOX
Patient seen and examined at bedside.    Overnight Events: Patient felt dizzy and diaphoretic at 11 AM and 1 PM today (telemetry with NSR). He currently denies CP, SOB, palpitations, pre-syncope.    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 He is asking if he may continue to drive.       REVIEW OF SYSTEMS:  Constitutional:     No fevers, chills, weight loss, weight gain  HEENT:                  No dry eyes, nasal congestion, postnasal drip  CV:                         No chest pain, palpitations, orthopnea, PND  Resp:                     No cough, SOB, dyspnea, wheezing, sputum  GI:                          No nausea, vomiting, abdominal pain, diarrhea, constipation  :                        No dysuria, nocturia, hematuria, increased urinary frequency  Musculoskeletal: No back pain, myalgias, arthralgias   Skin:                       No rash, pruritus, ecchymoses  Neurological:       see above  Psychiatric:           No anxiety, depression   Endocrine:            No hot/cold intolerance, polydipsia  Heme/Lymph:      No bleeding, easy bruising  Allergic/Immune: No itchy eyes, rhinorrhea, hives angioedema      Current Meds:  atorvastatin 40 milliGRAM(s) Oral at bedtime  buPROPion XL . 300 milliGRAM(s) Oral daily  heparin  Injectable 5000 Unit(s) SubCutaneous every 12 hours  losartan 50 milliGRAM(s) Oral daily  sodium chloride 0.9% lock flush 3 milliLiter(s) IV Push every 8 hours  venlafaxine XR. 150 milliGRAM(s) Oral two times a day      PAST MEDICAL & SURGICAL HISTORY:  Vertigo  Osteoarthritis (arthritis due to wear and tear of joints)  Glaucoma  Anxiety  ROSIO (obstructive sleep apnea)  Degenerative disc disease, cervical  GERD (gastroesophageal reflux disease)  Essential hypertension  Dyslipidemia  Depression  BPH (benign prostatic hypertrophy)  Morbid obesity due to excess calories  H/O lipoma: h/o excision lipoma back  H/O pilonidal cyst: h/o excision plilonidal cyst  H/O melanoma excision  H/O hernia repair  H/O laparoscopic adjustable gastric bandin- Defalted in 2017      Vitals:  T(F): 98.2 (-), Max: 98.4 (-07)  HR: 64 (-08) (63 - 72)  BP: 106/78 (-08) (97/49 - 134/68)  RR: 17 (04-08)  SpO2: 100% ()  I&O's Summary      Physical Exam:  Appearance: No acute distress; well appearing  Eyes: PERRL, EOMI, pink conjunctiva  HENT: Normal oral mucosa  Cardiovascular: RRR, S1, S2, no murmurs, rubs, or gallops; no edema; no JVD  Respiratory: Clear to auscultation bilaterally  Gastrointestinal: soft, non-tender, non-distended with normal bowel sounds  Musculoskeletal: No clubbing; no joint deformity   Neurologic: Non-focal  Lymphatic: No lymphadenopathy  Psychiatry: AAOx3, mood & affect appropriate  Skin: well-healed laceration on crown of head                          15.7   5.90  )-----------( 205      ( 2019 06:10 )             46.3     04-07    139  |  105  |  17  ----------------------------<  91  4.0   |  25  |  0.74    Ca    9.0      2019 06:10  Phos  4.6     04-06  Mg     2.0     04-07            Echo:  < from: Transthoracic Echocardiogram (19 @ 14:42) >  DIMENSIONS:  Dimensions:     Normal Values:  LA:     3.5 cm    2.0 - 4.0 cm  Ao:     3.1 cm    2.0 - 3.8 cm  SEPTUM: 0.8 cm    0.6 - 1.2 cm  PWT:    0.7 cm    0.6 - 1.1 cm  LVIDd:  5.1 cm    3.0 - 5.6 cm  LVIDs:  3.3 cm    1.8 - 4.0 cm  Derived Variables:  LVMI: 57 g/m2  RWT: 0.27  Fractional short: 35 %  Ejection Fraction (Teicholtz): 64 %  ------------------------------------------------------------------------  CONCLUSIONS:  1. Mitral annular calcification, otherwise normal mitral  valve. Minimal mitral regurgitation.  2. Aortic valve leaflet morphology not well visualized.  The valve is calcified. Peak transaortic valve gradient  equals 40 mm Hg, mean transaortic valve gradient equals 22  mm Hg, consistent with probable moderate aortic stenosis.  3. Normal left ventricular internal dimensions and wall  thicknesses.  4. Endocardium not well visualized; grossly normal left  ventricular systolic function.  5. The right ventricle is not well visualized; grossly  normal right ventricular systolic function.    < end of copied text >      Interpretation of Telemetry: NSR

## 2019-04-08 NOTE — PROGRESS NOTE ADULT - SUBJECTIVE AND OBJECTIVE BOX
FOLLOW UP VISIT:  SYMPTOMS:  Patient comfortablr   denies Cp ,sob or dizziness   MEDICATIONS  (STANDING):  atorvastatin 40 milliGRAM(s) Oral at bedtime  buPROPion XL . 300 milliGRAM(s) Oral daily  heparin  Injectable 5000 Unit(s) SubCutaneous every 12 hours  losartan 50 milliGRAM(s) Oral daily  metoprolol tartrate 25 milliGRAM(s) Oral two times a day  sodium chloride 0.9% lock flush 3 milliLiter(s) IV Push every 8 hours  venlafaxine XR. 150 milliGRAM(s) Oral two times a day    MEDICATIONS  (PRN):      Vital Signs Last 24 Hrs  T(C): 36.9 (08 Apr 2019 21:24), Max: 36.9 (08 Apr 2019 21:24)  T(F): 98.4 (08 Apr 2019 21:24), Max: 98.4 (08 Apr 2019 21:24)  HR: 68 (08 Apr 2019 21:24) (63 - 68)  BP: 135/71 (08 Apr 2019 21:24) (97/49 - 135/71)  BP(mean): --  RR: 17 (08 Apr 2019 21:24) (17 - 18)  SpO2: 100% (08 Apr 2019 21:24) (99% - 100%)    Daily     Daily     I&O's Detail      Physical Exam  obese male in NAd   Eyes: Anicteric , no injection   Neck: No sig JVD   Lungs:  CTA   Cor:  SM 3/6   Abd: obese NT + BS   Ext: denies C/C/e  Pulses: +2   Neuro: AxoX3        LABS          CBC Full  -  ( 07 Apr 2019 06:10 )  WBC Count : 5.90 K/uL  RBC Count : 4.91 M/uL  Hemoglobin : 15.7 g/dL  Hematocrit : 46.3 %  Platelet Count - Automated : 205 K/uL  Mean Cell Volume : 94.3 fL  Mean Cell Hemoglobin : 32.0 pg  Mean Cell Hemoglobin Concentration : 33.9 %  Auto Neutrophil # : x  Auto Lymphocyte # : x  Auto Monocyte # : x  Auto Eosinophil # : x  Auto Basophil # : x  Auto Neutrophil % : x  Auto Lymphocyte % : x  Auto Monocyte % : x  Auto Eosinophil % : x  Auto Basophil % : x    04-07    139  |  105  |  17  ----------------------------<  91  4.0   |  25  |  0.74    Ca    9.0      07 Apr 2019 06:10  Mg     2.0     04-07      Lipid panel  TSH:  Digoxin level    ECHO  CONCLUSIONS:  1. Mitral annular calcification, otherwise normal mitral  valve. Minimal mitral regurgitation.  2. Aortic valve leaflet morphology not well visualized.  The valve is calcified. Peak transaortic valve gradient  equals 40 mm Hg, mean transaortic valve gradient equals 22  mm Hg, consistent with probable moderate aortic stenosis.  3. Normal left ventricular internal dimensions and wall  thicknesses.  4. Endocardium not well visualized; grossly normal left  ventricular systolic function.  5. The right ventricle is not well visualized; grossly  normal right ventricular systolic function.      Nuclear Stress Rb 82 PET  medium defect of Mid - moderate intensity megan-septal , Scar Vs attenuation artifact (EF stress 63%  Rest 62% )    ECG:    Telemetry:  Sr @ 74 bpm , PVC     RADIOLOGY & ADDITIONAL STUDIES:    ASSESSMENT & PLAN:   59 year old male with HX of Obesity ,HTN , HLD , ROSIO , Anxiety /depression & BPH . Patient with recurrent dizziness over past 5 years with one episode of syncope recently while urinating . He is S/p Gastric Sleeve 2 years ago with 100 Lbs WT loss , Recent echo with Moderate AS . . Patient was seen as out-pt with Dr Packer for recurrent dizziness , Cardiac w/u reported negative .This AM while pt on monitoring service , he triggered an event which was associated with dizziness ,tracing with NSVT @ 240 bpm ,7 second , around 25 beats ,   patient susequently sent to Er for further evaluation , He is off Flomax since last week . denies Cp ,Sob ,Palpitation ,Pedal edema , PND or ESQUIVEL     1. NSVT Symptomatic , / recurrent dizziness / one episode of Syncope    Abnormal perfusion Scan on PET Attenuation Vs Scar LVEF 62%    Cardiac Cath Dr Venegas R + L   On Lopressor /Cozaar    EP consult appreciated   Tele  SR / PVC   continue Tele      2. HTN   continue Losartan 50 mg daily      3. HLD   Continue Liptor 40 qhs        4. Anxiety /depression     Continue Effexor  150 BID  & Bupropione 300 daily       5 Aortic Stenosis        Moderate       6. DVT prophylaxis

## 2019-04-08 NOTE — PROGRESS NOTE ADULT - ASSESSMENT
59M with HTN, HLD, BPH, ROSIO, anxiety, recurrent syncope, and s/p gastric sleeve in 2004 sent in by cardiologist for report of 25B NSVT noted on event monitor associated with dizziness. Of note, patient was seen in ED 3/21/19 for head laceration due to syncope.     #Syncope  #NSVT  - Continue to monitor on telemetry   - Obtain outpatient records, including event monitor documentation of NSVT  - Given his LOC and NSVT on event monitor, he will likely require an EP study  - Maintain K > 4.0, Mg > 2.0    SHERICE Coulter  Cardiology Fellow  [id number]  New EP consults: [id number] 59M with HTN, HLD, BPH, ROSIO, anxiety, recurrent syncope, and s/p gastric sleeve in 2004 sent in by cardiologist for report of 25B NSVT noted on event monitor associated with dizziness. Of note, patient was seen in ED 3/21/19 for head laceration due to syncope.     #Syncope  #NSVT  - Continue to monitor on telemetry   - Event monitor with NSVT, concerning for ischemic VT  - Recommend coronary angiogram to assess for CAD  - Start metoprolol tartrate 25 mg BID, hold for HR<55   - Maintain K > 4.0, Mg > 2.0    JShyla Coulter  Cardiology Fellow  [id number]  New EP consults: [id number]

## 2019-04-09 PROCEDURE — 99233 SBSQ HOSP IP/OBS HIGH 50: CPT

## 2019-04-09 RX ORDER — SODIUM CHLORIDE 9 MG/ML
500 INJECTION INTRAMUSCULAR; INTRAVENOUS; SUBCUTANEOUS
Qty: 0 | Refills: 0 | Status: DISCONTINUED | OUTPATIENT
Start: 2019-04-09 | End: 2019-04-12

## 2019-04-09 RX ORDER — LOSARTAN POTASSIUM 100 MG/1
25 TABLET, FILM COATED ORAL DAILY
Qty: 0 | Refills: 0 | Status: DISCONTINUED | OUTPATIENT
Start: 2019-04-09 | End: 2019-04-10

## 2019-04-09 RX ADMIN — HEPARIN SODIUM 5000 UNIT(S): 5000 INJECTION INTRAVENOUS; SUBCUTANEOUS at 05:12

## 2019-04-09 RX ADMIN — SODIUM CHLORIDE 3 MILLILITER(S): 9 INJECTION INTRAMUSCULAR; INTRAVENOUS; SUBCUTANEOUS at 14:31

## 2019-04-09 RX ADMIN — Medication 150 MILLIGRAM(S): at 05:13

## 2019-04-09 RX ADMIN — HEPARIN SODIUM 5000 UNIT(S): 5000 INJECTION INTRAVENOUS; SUBCUTANEOUS at 17:11

## 2019-04-09 RX ADMIN — Medication 150 MILLIGRAM(S): at 17:11

## 2019-04-09 RX ADMIN — SODIUM CHLORIDE 75 MILLILITER(S): 9 INJECTION INTRAMUSCULAR; INTRAVENOUS; SUBCUTANEOUS at 13:51

## 2019-04-09 RX ADMIN — SODIUM CHLORIDE 3 MILLILITER(S): 9 INJECTION INTRAMUSCULAR; INTRAVENOUS; SUBCUTANEOUS at 21:22

## 2019-04-09 RX ADMIN — LOSARTAN POTASSIUM 50 MILLIGRAM(S): 100 TABLET, FILM COATED ORAL at 05:12

## 2019-04-09 RX ADMIN — Medication 25 MILLIGRAM(S): at 17:35

## 2019-04-09 RX ADMIN — Medication 25 MILLIGRAM(S): at 05:12

## 2019-04-09 RX ADMIN — SODIUM CHLORIDE 3 MILLILITER(S): 9 INJECTION INTRAMUSCULAR; INTRAVENOUS; SUBCUTANEOUS at 05:16

## 2019-04-09 RX ADMIN — ATORVASTATIN CALCIUM 40 MILLIGRAM(S): 80 TABLET, FILM COATED ORAL at 21:22

## 2019-04-09 RX ADMIN — BUPROPION HYDROCHLORIDE 300 MILLIGRAM(S): 150 TABLET, EXTENDED RELEASE ORAL at 17:11

## 2019-04-09 NOTE — PROGRESS NOTE ADULT - SUBJECTIVE AND OBJECTIVE BOX
Patient seen and examined at bedside.    Overnight Events: No complaints. [facility] today with normal coronaries.       REVIEW OF SYSTEMS:  Constitutional:     No fevers, chills, weight loss, weight gain  HEENT:                  No dry eyes, nasal congestion, postnasal drip  CV:                         No chest pain, palpitations, orthopnea, PND  Resp:                     No cough, SOB, dyspnea, wheezing, sputum  GI:                          No nausea, vomiting, abdominal pain, diarrhea, constipation  :                        No dysuria, nocturia, hematuria, increased urinary frequency  Musculoskeletal: No back pain, myalgias, arthralgias   Skin:                       No rash, pruritus, ecchymoses  Neurological:        No headache, dizziness, syncope, weakness, numbness  Psychiatric:           No anxiety, depression   Endocrine:            No hot/cold intolerance, polydipsia  Heme/Lymph:      No bleeding, easy bruising  Allergic/Immune: No itchy eyes, rhinorrhea, hives angioedema      Current Meds:  atorvastatin 40 milliGRAM(s) Oral at bedtime  buPROPion XL . 300 milliGRAM(s) Oral daily  heparin  Injectable 5000 Unit(s) SubCutaneous every 12 hours  losartan 50 milliGRAM(s) Oral daily  metoprolol tartrate 25 milliGRAM(s) Oral two times a day  sodium chloride 0.9% lock flush 3 milliLiter(s) IV Push every 8 hours  venlafaxine XR. 150 milliGRAM(s) Oral two times a day      PAST MEDICAL & SURGICAL HISTORY:  Vertigo  Osteoarthritis (arthritis due to wear and tear of joints)  Glaucoma  Anxiety  ROSIO (obstructive sleep apnea)  Degenerative disc disease, cervical  GERD (gastroesophageal reflux disease)  Essential hypertension  Dyslipidemia  Depression  BPH (benign prostatic hypertrophy)  Morbid obesity due to excess calories  H/O lipoma: h/o excision lipoma back  H/O pilonidal cyst: h/o excision plilonidal cyst  H/O melanoma excision  H/O hernia repair  H/O laparoscopic adjustable gastric bandin- Defalted in 2017      Vitals:  T(F): 98.2 (-), Max: 98.4 (-)  HR: 63 (-) (63 - 68)  BP: 109/64 (-) (109/64 - 135/71)  RR: 17 (-)  SpO2: 98% (-)  I&O's Summary      Physical Exam:  Appearance: No acute distress; well appearing  Eyes: PERRL, EOMI, pink conjunctiva  HENT: Normal oral mucosa  Cardiovascular: RRR, S1, S2, no murmurs, rubs, or gallops; no edema; no JVD  Respiratory: Clear to auscultation bilaterally  Gastrointestinal: soft, non-tender, non-distended with normal bowel sounds  Musculoskeletal: No clubbing; no joint deformity   Neurologic: Non-focal  Lymphatic: No lymphadenopathy  Psychiatry: AAOx3, mood & affect appropriate  Skin: No rashes, ecchymoses, or cyanosis      Echo:  < from: Transthoracic Echocardiogram (19 @ 14:42) >  DIMENSIONS:  Dimensions:     Normal Values:  LA:     3.5 cm    2.0 - 4.0 cm  Ao:     3.1 cm    2.0 - 3.8 cm  SEPTUM: 0.8 cm    0.6 - 1.2 cm  PWT:    0.7 cm    0.6 - 1.1 cm  LVIDd:  5.1 cm    3.0 - 5.6 cm  LVIDs:  3.3 cm    1.8 - 4.0 cm  Derived Variables:  LVMI: 57 g/m2  RWT: 0.27  Fractional short: 35 %  Ejection Fraction (Teicholtz): 64 %  ------------------------------------------------------------------------  CONCLUSIONS:  1. Mitral annular calcification, otherwise normal mitral  valve. Minimal mitral regurgitation.  2. Aortic valve leaflet morphology not well visualized.  The valve is calcified. Peak transaortic valve gradient  equals 40 mm Hg, mean transaortic valve gradient equals 22  mm Hg, consistent with probable moderate aortic stenosis.  3. Normal left ventricular internal dimensions and wall  thicknesses.  4. Endocardium not well visualized; grossly normal left  ventricular systolic function.  5. The right ventricle is not well visualized; grossly  normal right ventricular systolic function.    < end of copied text >    < from: Cardiac Cath Lab - Adult (19 @ 11:58) >  CORONARY VESSELS: The coronary circulation is right dominant.  LM:   --  LM: Normal.  LAD:   --  LAD: Normal.  CX:   --  Circumflex: Normal.  RCA:   --  RCA: Normal.  COMPLICATIONS: There were no complications.  DIAGNOSTIC IMPRESSIONS: Patient has normal coronaries and mild aortic  stenosis and normal LVEDP    < end of copied text >    Interpretation of Telemetry: NSR, PACs

## 2019-04-09 NOTE — PROGRESS NOTE ADULT - SUBJECTIVE AND OBJECTIVE BOX
FOLLOW UP VISIT:  SYMPTOMS:  Patient denies cp, sob ,dizziness or palpitation     MEDICATIONS  (STANDING):  atorvastatin 40 milliGRAM(s) Oral at bedtime  buPROPion XL . 300 milliGRAM(s) Oral daily  heparin  Injectable 5000 Unit(s) SubCutaneous every 12 hours  losartan 50 milliGRAM(s) Oral daily  metoprolol tartrate 25 milliGRAM(s) Oral two times a day  sodium chloride 0.9% lock flush 3 milliLiter(s) IV Push every 8 hours  sodium chloride 0.9%. 500 milliLiter(s) (75 mL/Hr) IV Continuous <Continuous>  venlafaxine XR. 150 milliGRAM(s) Oral two times a day    MEDICATIONS  (PRN):      Vital Signs Last 24 Hrs  T(C): 36.7 (09 Apr 2019 15:00), Max: 36.9 (08 Apr 2019 21:24)  T(F): 98.1 (09 Apr 2019 15:00), Max: 98.4 (08 Apr 2019 21:24)  HR: 68 (09 Apr 2019 17:09) (63 - 68)  BP: 109/56 (09 Apr 2019 17:09) (109/56 - 135/71)  BP(mean): --  RR: 17 (09 Apr 2019 17:09) (17 - 17)  SpO2: 97% (09 Apr 2019 17:09) (97% - 100%)    Daily Height in cm: 177.8 (09 Apr 2019 05:04)    Daily     I&O's Detail      Physical Exam   obese male in NAd   Eyes: Anicteric , no injection   Neck: No sig JVD   Lungs:  CTA   Cor:  SM 3/6   Abd: obese NT + BS   Ext: denies C/C/e  Pulses: +2   Neuro: AxoX3          LABS                  Lipid panel  TSH:  Digoxin level    ECHO    CONCLUSIONS:  1. Mitral annular calcification, otherwise normal mitral  valve. Minimal mitral regurgitation.  2. Aortic valve leaflet morphology not well visualized.  The valve is calcified. Peak transaortic valve gradient  equals 40 mm Hg, mean transaortic valve gradient equals 22  mm Hg, consistent with probable moderate aortic stenosis.  3. Normal left ventricular internal dimensions and wall  thicknesses.  4. Endocardium not well visualized; grossly normal left  ventricular systolic function.  5. The right ventricle is not well visualized; grossly  normal right ventricular systolic function.      Nuclear Stress Rb 82 PET  medium defect of Mid - moderate intensity megan-septal , Scar Vs attenuation artifact (EF stress 63%  Rest 62% )             CORONARY VESSELS: The coronary circulation is right dominant.  LM:   --  LM: Normal.  LAD:   --  LAD: Normal.  CX:   --  Circumflex: Normal.  RCA:   --  RCA: Normal.  COMPLICATIONS: There were no complications.  DIAGNOSTIC IMPRESSIONS: Patient has normal coronaries and mild aortic  stenosis and normal LVEDP    ECG:    Telemetry: SR     RADIOLOGY & ADDITIONAL STUDIES:    ASSESSMENT & PLAN:     59 year old male with HX of Obesity ,HTN , HLD , ROSIO , Anxiety /depression & BPH . Patient with recurrent dizziness over past 5 years with one episode of syncope recently while urinating . He is S/p Gastric Sleeve 2 years ago with 100 Lbs WT loss , Recent echo with Moderate AS . . Patient was seen as out-pt with Dr Packer for recurrent dizziness , Cardiac w/u reported negative .This AM while pt on monitoring service , he triggered an event which was associated with dizziness ,tracing with NSVT @ 240 bpm ,7 second , around 25 beats ,   patient susequently sent to Er for further evaluation , He is off Flomax since last week . denies Cp ,Sob ,Palpitation ,Pedal edema , PND or ESQUIVEL     1. NSVT Symptomatic , / recurrent dizziness / one episode of Syncope    Abnormal perfusion Scan on PET Attenuation Vs Scar LVEF 62%    Cardiac Cath TODAY  normal coronaries and mild aortic  stenosis and normal LVEDP  EP f/u appreciated Cardiac MRI  , AICD   Continue  Lopressor     2. HTN   Taper  Losartan 25 mg daily      3. HLD   Continue Liptor 40 qhs        4. Anxiety /depression     Continue Effexor  150 BID  & Bupropione 300 daily       5 Aortic Stenosis       S/P Cath Mild AS       6. DVT prophylaxis

## 2019-04-09 NOTE — CHART NOTE - NSCHARTNOTEFT_GEN_A_CORE
Patient is s/p LHC via RFA. He is asymptomatic. Denies headache, dizziness, blurred vision, chest pain, shortness of breath, nausea, vomiting, numbness, paresthesias. VSS. Right groin stable. Dressing clean, dry, and intact without bleeding, hematoma, or tenderness. 2+ pulses RLE. Good capillary refill. Will continue to monitor patient overnight. Cardiac MRI w/ and w/o contrast ordered to assess for scar which will help determine if ICD should be implanted per EP. Spoke with Jayesh (MRI supervisor [id number]) who will try to arrange for MRI tomorrow. Patient made aware.

## 2019-04-09 NOTE — PROGRESS NOTE ADULT - ASSESSMENT
59M with HTN, HLD, BPH, ROSIO, anxiety, recurrent syncope, and s/p gastric sleeve in 2004 sent in by cardiologist for 25 beats NSVT noted on event monitor associated with dizziness. Of note, patient was seen in ED 3/21/19 for head laceration due to syncope.     #Syncope  #NSVT  - Continue to monitor on telemetry   - Event monitor with NSVT  - Continue metoprolol tartrate 25 mg BID, hold for HR<55   - Maintain K > 4.0, Mg > 2.0  - [facility] 4/9/19 with clean coronaries  - Recommend cardiac MRI with and w/o contrast to assess for scar which will help determine if ICD should be implanted    Discussed with Dr. Casiano.     SHERICE Coulter  Cardiology Fellow  [id number]  New EP consults: [id number]

## 2019-04-10 LAB
ANION GAP SERPL CALC-SCNC: 10 MMO/L — SIGNIFICANT CHANGE UP (ref 7–14)
BUN SERPL-MCNC: 13 MG/DL — SIGNIFICANT CHANGE UP (ref 7–23)
CALCIUM SERPL-MCNC: 8.8 MG/DL — SIGNIFICANT CHANGE UP (ref 8.4–10.5)
CHLORIDE SERPL-SCNC: 106 MMOL/L — SIGNIFICANT CHANGE UP (ref 98–107)
CO2 SERPL-SCNC: 24 MMOL/L — SIGNIFICANT CHANGE UP (ref 22–31)
CREAT SERPL-MCNC: 0.78 MG/DL — SIGNIFICANT CHANGE UP (ref 0.5–1.3)
GLUCOSE SERPL-MCNC: 121 MG/DL — HIGH (ref 70–99)
HCT VFR BLD CALC: 45.4 % — SIGNIFICANT CHANGE UP (ref 39–50)
HGB BLD-MCNC: 15.1 G/DL — SIGNIFICANT CHANGE UP (ref 13–17)
MAGNESIUM SERPL-MCNC: 2 MG/DL — SIGNIFICANT CHANGE UP (ref 1.6–2.6)
MCHC RBC-ENTMCNC: 31.6 PG — SIGNIFICANT CHANGE UP (ref 27–34)
MCHC RBC-ENTMCNC: 33.3 % — SIGNIFICANT CHANGE UP (ref 32–36)
MCV RBC AUTO: 95 FL — SIGNIFICANT CHANGE UP (ref 80–100)
NRBC # FLD: 0 K/UL — SIGNIFICANT CHANGE UP (ref 0–0)
PLATELET # BLD AUTO: 192 K/UL — SIGNIFICANT CHANGE UP (ref 150–400)
PMV BLD: 9.1 FL — SIGNIFICANT CHANGE UP (ref 7–13)
POTASSIUM SERPL-MCNC: 4 MMOL/L — SIGNIFICANT CHANGE UP (ref 3.5–5.3)
POTASSIUM SERPL-SCNC: 4 MMOL/L — SIGNIFICANT CHANGE UP (ref 3.5–5.3)
RBC # BLD: 4.78 M/UL — SIGNIFICANT CHANGE UP (ref 4.2–5.8)
RBC # FLD: 12.5 % — SIGNIFICANT CHANGE UP (ref 10.3–14.5)
SODIUM SERPL-SCNC: 140 MMOL/L — SIGNIFICANT CHANGE UP (ref 135–145)
WBC # BLD: 6.42 K/UL — SIGNIFICANT CHANGE UP (ref 3.8–10.5)
WBC # FLD AUTO: 6.42 K/UL — SIGNIFICANT CHANGE UP (ref 3.8–10.5)

## 2019-04-10 PROCEDURE — 99233 SBSQ HOSP IP/OBS HIGH 50: CPT

## 2019-04-10 PROCEDURE — 75561 CARDIAC MRI FOR MORPH W/DYE: CPT | Mod: 26

## 2019-04-10 RX ORDER — LOSARTAN POTASSIUM 100 MG/1
25 TABLET, FILM COATED ORAL AT BEDTIME
Qty: 0 | Refills: 0 | Status: DISCONTINUED | OUTPATIENT
Start: 2019-04-10 | End: 2019-04-10

## 2019-04-10 RX ORDER — LOSARTAN POTASSIUM 100 MG/1
25 TABLET, FILM COATED ORAL AT BEDTIME
Qty: 0 | Refills: 0 | Status: DISCONTINUED | OUTPATIENT
Start: 2019-04-10 | End: 2019-04-12

## 2019-04-10 RX ORDER — METOPROLOL TARTRATE 50 MG
25 TABLET ORAL ONCE
Qty: 0 | Refills: 0 | Status: DISCONTINUED | OUTPATIENT
Start: 2019-04-10 | End: 2019-04-12

## 2019-04-10 RX ADMIN — LOSARTAN POTASSIUM 25 MILLIGRAM(S): 100 TABLET, FILM COATED ORAL at 06:09

## 2019-04-10 RX ADMIN — Medication 150 MILLIGRAM(S): at 17:43

## 2019-04-10 RX ADMIN — Medication 25 MILLIGRAM(S): at 08:37

## 2019-04-10 RX ADMIN — Medication 150 MILLIGRAM(S): at 05:37

## 2019-04-10 RX ADMIN — BUPROPION HYDROCHLORIDE 300 MILLIGRAM(S): 150 TABLET, EXTENDED RELEASE ORAL at 12:20

## 2019-04-10 RX ADMIN — ATORVASTATIN CALCIUM 40 MILLIGRAM(S): 80 TABLET, FILM COATED ORAL at 21:41

## 2019-04-10 RX ADMIN — SODIUM CHLORIDE 3 MILLILITER(S): 9 INJECTION INTRAMUSCULAR; INTRAVENOUS; SUBCUTANEOUS at 12:20

## 2019-04-10 RX ADMIN — SODIUM CHLORIDE 3 MILLILITER(S): 9 INJECTION INTRAMUSCULAR; INTRAVENOUS; SUBCUTANEOUS at 21:41

## 2019-04-10 RX ADMIN — Medication 25 MILLIGRAM(S): at 17:43

## 2019-04-10 RX ADMIN — HEPARIN SODIUM 5000 UNIT(S): 5000 INJECTION INTRAVENOUS; SUBCUTANEOUS at 17:43

## 2019-04-10 RX ADMIN — SODIUM CHLORIDE 3 MILLILITER(S): 9 INJECTION INTRAMUSCULAR; INTRAVENOUS; SUBCUTANEOUS at 05:37

## 2019-04-10 RX ADMIN — HEPARIN SODIUM 5000 UNIT(S): 5000 INJECTION INTRAVENOUS; SUBCUTANEOUS at 05:37

## 2019-04-10 NOTE — PROGRESS NOTE ADULT - SUBJECTIVE AND OBJECTIVE BOX
FOLLOW UP VISIT:  SYMPTOMS:  patient comfortable   denies Cp ,Sob ,Dizziness or Palpitation     MEDICATIONS  (STANDING):  atorvastatin 40 milliGRAM(s) Oral at bedtime  buPROPion XL . 300 milliGRAM(s) Oral daily  heparin  Injectable 5000 Unit(s) SubCutaneous every 12 hours  losartan 25 milliGRAM(s) Oral daily  metoprolol tartrate 25 milliGRAM(s) Oral two times a day  sodium chloride 0.9% lock flush 3 milliLiter(s) IV Push every 8 hours  sodium chloride 0.9%. 500 milliLiter(s) (75 mL/Hr) IV Continuous <Continuous>  venlafaxine XR. 150 milliGRAM(s) Oral two times a day    MEDICATIONS  (PRN):      Vital Signs Last 24 Hrs  T(C): 36.8 (10 Apr 2019 12:39), Max: 36.8 (09 Apr 2019 23:06)  T(F): 98.2 (10 Apr 2019 12:39), Max: 98.2 (09 Apr 2019 23:06)  HR: 63 (10 Apr 2019 17:41) (54 - 74)  BP: 120/67 (10 Apr 2019 17:41) (102/53 - 120/79)  BP(mean): --  RR: 17 (10 Apr 2019 17:41) (17 - 18)  SpO2: 100% (10 Apr 2019 17:41) (98% - 100%)    Daily     Daily     I&O's Detail      Physical Exam    obese male in NAd   Eyes: Anicteric , no injection   Neck: No sig JVD   Lungs:  CTA   Cor:  SM 3/6   Abd: obese NT + BS   Ext: denies C/C/e  Pulses: +2   Neuro: AxoX3          LABS          CBC Full  -  ( 10 Apr 2019 06:20 )  WBC Count : 6.42 K/uL  RBC Count : 4.78 M/uL  Hemoglobin : 15.1 g/dL  Hematocrit : 45.4 %  Platelet Count - Automated : 192 K/uL  Mean Cell Volume : 95.0 fL  Mean Cell Hemoglobin : 31.6 pg  Mean Cell Hemoglobin Concentration : 33.3 %  Auto Neutrophil # : x  Auto Lymphocyte # : x  Auto Monocyte # : x  Auto Eosinophil # : x  Auto Basophil # : x  Auto Neutrophil % : x  Auto Lymphocyte % : x  Auto Monocyte % : x  Auto Eosinophil % : x  Auto Basophil % : x    04-10    140  |  106  |  13  ----------------------------<  121<H>  4.0   |  24  |  0.78    Ca    8.8      10 Apr 2019 06:20  Mg     2.0     04-10      Lipid panel  TSH:  Digoxin level    ECHO  CONCLUSIONS:  1. Mitral annular calcification, otherwise normal mitral  valve. Minimal mitral regurgitation.  2. Aortic valve leaflet morphology not well visualized.  The valve is calcified. Peak transaortic valve gradient  equals 40 mm Hg, mean transaortic valve gradient equals 22  mm Hg, consistent with probable moderate aortic stenosis.  3. Normal left ventricular internal dimensions and wall  thicknesses.  4. Endocardium not well visualized; grossly normal left  ventricular systolic function.  5. The right ventricle is not well visualized; grossly  normal right ventricular systolic function.      Nuclear Stress Rb 82 PET  medium defect of Mid - moderate intensity megan-septal , Scar Vs attenuation artifact (EF stress 63%  Rest 62% )             CORONARY VESSELS: The coronary circulation is right dominant.  LM:   --  LM: Normal.  LAD:   --  LAD: Normal.  CX:   --  Circumflex: Normal.  RCA:   --  RCA: Normal.  COMPLICATIONS: There were no complications.  DIAGNOSTIC IMPRESSIONS: Patient has normal coronaries and mild aortic  stenosis and normal LVEDP    ECG:    Telemetry: SR , PVC     RADIOLOGY & ADDITIONAL STUDIES:    ASSESSMENT & PLAN:     59 year old male with HX of Obesity ,HTN , HLD , ROSIO , Anxiety /depression & BPH . Patient with recurrent dizziness over past 5 years with one episode of syncope recently while urinating . He is S/p Gastric Sleeve 2 years ago with 100 Lbs WT loss , Recent echo with Moderate AS . . Patient was seen as out-pt with Dr Packer for recurrent dizziness , Cardiac w/u reported negative .This AM while pt on monitoring service , he triggered an event which was associated with dizziness ,tracing with NSVT @ 240 bpm ,7 second , around 25 beats ,   patient susequently sent to Er for further evaluation , He is off Flomax since last week . denies Cp ,Sob ,Palpitation ,Pedal edema , PND or ESQUIVEL     1. NSVT Symptomatic , / recurrent dizziness / one episode of Syncope    Abnormal perfusion Scan on PET Attenuation Vs Scar LVEF 62%    Cardiac Cath   normal coronaries and mild aortic  stenosis and normal LVEDP  s/p Cardiac MRI, EF 40% as per pt   MRI official  result pending   AICD as per EP   Change Lopressor to metoprolol succinate 25 qd     2. HTN   On  Losartan 25 mg  daily , Metoprolol tartarate   changed to Toprol XL      3. HLD   Continue Liptor 40 qhs        4. Anxiety /depression     Continue Effexor  150 BID  & Bupropione 300 daily       5 Aortic Stenosis       S/P Cath Mild AS       6. DVT prophylaxis

## 2019-04-10 NOTE — PROGRESS NOTE ADULT - ASSESSMENT
59M with HTN, HLD, BPH, ROSIO, anxiety, recurrent syncope, and s/p gastric sleeve in 2004 sent in by cardiologist for 25 beats NSVT noted on event monitor associated with dizziness. Of note, patient was seen in ED 3/21/19 for head laceration due to syncope.     #Syncope  #NSVT  - Continue to monitor on telemetry   - Event monitor with NSVT  - Continue metoprolol tartrate 25 mg BID, hold for HR<55   - Maintain K > 4.0, Mg > 2.0  - [facility] 4/9/19 with clean coronaries  - Will follow-up on cardiac MRI with and w/o contrast       SHERICE Coulter  Cardiology Fellow  [id number]  New EP consults: [id number] 59M with HTN, HLD, BPH, ROSIO, anxiety, recurrent syncope, and s/p gastric sleeve in 2004 sent in by cardiologist for 25 beats NSVT noted on event monitor associated with dizziness. Of note, patient was seen in ED 3/21/19 for head laceration due to syncope.     #Syncope  #NSVT  - Event monitor with NSVT  - Continue to monitor on telemetry   - Continue metoprolol tartrate 25 mg BID, hold for HR<55   - Maintain K > 4.0, Mg > 2.0  - [facility] 4/9/19 with clean coronaries  - Will follow-up on cardiac MRI with and w/o contrast       SHERICE Coulter  Cardiology Fellow  [id number]  New EP consults: [id number] 59M with HTN, HLD, BPH, ROSIO, anxiety, recurrent syncope, and s/p gastric sleeve in 2004 sent in by cardiologist for 25 beats NSVT noted on event monitor associated with dizziness. Of note, patient was seen in ED 3/21/19 for head laceration due to syncope.     #Syncope  #NSVT  - Event monitor with NSVT  - Continue to monitor on telemetry   - Continue metoprolol tartrate 25 mg BID, hold for HR<55   - Maintain K > 4.0, Mg > 2.0  - [facility] 4/9/19 with clean coronaries  - Will follow-up on cardiac MRI with and w/o contrast     ***ADDENDUM***  Recommend AICD implantation. Discussed with patient and he is agreeable.     - NPO after midnight  - Check CBC, BMP, coags and T&S in the morning    SHERICE Coulter  Cardiology Fellow  [id number]  New EP consults: [id number]

## 2019-04-10 NOTE — PROGRESS NOTE ADULT - SUBJECTIVE AND OBJECTIVE BOX
Patient seen and examined at bedside.    Overnight Events:       REVIEW OF SYSTEMS:  Constitutional:     No fevers, chills, weight loss, weight gain  HEENT:                  No dry eyes, nasal congestion, postnasal drip  CV:                         No chest pain, palpitations, orthopnea, PND  Resp:                     No cough, SOB, dyspnea, wheezing, sputum  GI:                          No nausea, vomiting, abdominal pain, diarrhea, constipation  :                        No dysuria, nocturia, hematuria, increased urinary frequency  Musculoskeletal: No back pain, myalgias, arthralgias   Skin:                       No rash, pruritus, ecchymoses  Neurological:        No headache, dizziness, syncope, weakness, numbness  Psychiatric:           No anxiety, depression   Endocrine:            No hot/cold intolerance, polydipsia  Heme/Lymph:      No bleeding, easy bruising  Allergic/Immune: No itchy eyes, rhinorrhea, hives angioedema      Current Meds:  atorvastatin 40 milliGRAM(s) Oral at bedtime  buPROPion XL . 300 milliGRAM(s) Oral daily  heparin  Injectable 5000 Unit(s) SubCutaneous every 12 hours  losartan 25 milliGRAM(s) Oral daily  metoprolol tartrate 25 milliGRAM(s) Oral two times a day  sodium chloride 0.9% lock flush 3 milliLiter(s) IV Push every 8 hours  sodium chloride 0.9%. 500 milliLiter(s) IV Continuous <Continuous>  venlafaxine XR. 150 milliGRAM(s) Oral two times a day      PAST MEDICAL & SURGICAL HISTORY:  Vertigo  Osteoarthritis (arthritis due to wear and tear of joints)  Glaucoma  Anxiety  ROSIO (obstructive sleep apnea)  Degenerative disc disease, cervical  GERD (gastroesophageal reflux disease)  Essential hypertension  Dyslipidemia  Depression  BPH (benign prostatic hypertrophy)  Morbid obesity due to excess calories  H/O lipoma: h/o excision lipoma back  H/O pilonidal cyst: h/o excision plilonidal cyst  H/O melanoma excision  H/O hernia repair  H/O laparoscopic adjustable gastric bandin- Defalted in 2017      Vitals:  T(F): 97.9 (-10), Max: 98.2 (-09)  HR: 65 (-10) (54 - 74)  BP: 115/71 (-10) (102/53 - 115/71)  RR: 17 (-10)  SpO2: 99% (-10)  I&O's Summary      Physical Exam:  Appearance: No acute distress; well appearing  Eyes: PERRL, EOMI, pink conjunctiva  HENT: Normal oral mucosa  Cardiovascular: RRR, S1, S2, no murmurs, rubs, or gallops; no edema; no JVD  Respiratory: Clear to auscultation bilaterally  Gastrointestinal: soft, non-tender, non-distended with normal bowel sounds  Musculoskeletal: No clubbing; no joint deformity   Neurologic: Non-focal  Lymphatic: No lymphadenopathy  Psychiatry: AAOx3, mood & affect appropriate  Skin: No rashes, ecchymoses, or cyanosis                          15.1   6.42  )-----------( 192      ( 10 Apr 2019 06:20 )             45.4     04-10    140  |  106  |  13  ----------------------------<  121<H>  4.0   |  24  |  0.78    Ca    8.8      10 Apr 2019 06:20  Mg     2.0     04-10            Echo:  < from: Transthoracic Echocardiogram (19 @ 14:42) >  DIMENSIONS:  Dimensions:     Normal Values:  LA:     3.5 cm    2.0 - 4.0 cm  Ao:     3.1 cm    2.0 - 3.8 cm  SEPTUM: 0.8 cm    0.6 - 1.2 cm  PWT:    0.7 cm    0.6 - 1.1 cm  LVIDd:  5.1 cm    3.0 - 5.6 cm  LVIDs:  3.3 cm    1.8 - 4.0 cm  Derived Variables:  LVMI: 57 g/m2  RWT: 0.27  Fractional short: 35 %  Ejection Fraction (Teicholtz): 64 %  ------------------------------------------------------------------------  CONCLUSIONS:  1. Mitral annular calcification, otherwise normal mitral  valve. Minimal mitral regurgitation.  2. Aortic valve leaflet morphology not well visualized.  The valve is calcified. Peak transaortic valve gradient  equals 40 mm Hg, mean transaortic valve gradient equals 22  mm Hg, consistent with probable moderate aortic stenosis.  3. Normal left ventricular internal dimensions and wall  thicknesses.  4. Endocardium not well visualized; grossly normal left  ventricular systolic function.  5. The right ventricle is not well visualized; grossly  normal right ventricular systolic function.    < end of copied text >    < from: Cardiac Cath Lab - Adult (19 @ 11:58) >  CORONARY VESSELS: The coronary circulation is right dominant.  LM:   --  LM: Normal.  LAD:   --  LAD: Normal.  CX:   --  Circumflex: Normal.  RCA:   --  RCA: Normal.  COMPLICATIONS: There were no complications.  DIAGNOSTIC IMPRESSIONS: Patient has normal coronaries and mild aortic  stenosis and normal LVEDP    < end of copied text >    Interpretation of Telemetry: Patient seen and examined at bedside.    Overnight Events: No events on tele      REVIEW OF SYSTEMS:  Constitutional:     No fevers, chills, weight loss, weight gain  HEENT:                  No dry eyes, nasal congestion, postnasal drip  CV:                         No chest pain, palpitations, orthopnea, PND  Resp:                     No cough, SOB, dyspnea, wheezing, sputum  GI:                          No nausea, vomiting, abdominal pain, diarrhea, constipation  :                        No dysuria, nocturia, hematuria, increased urinary frequency  Musculoskeletal: No back pain, myalgias, arthralgias   Skin:                       No rash, pruritus, ecchymoses  Neurological:        No headache, dizziness, syncope, weakness, numbness  Psychiatric:           No anxiety, depression   Endocrine:            No hot/cold intolerance, polydipsia  Heme/Lymph:      No bleeding, easy bruising  Allergic/Immune: No itchy eyes, rhinorrhea, hives angioedema      Current Meds:  atorvastatin 40 milliGRAM(s) Oral at bedtime  buPROPion XL . 300 milliGRAM(s) Oral daily  heparin  Injectable 5000 Unit(s) SubCutaneous every 12 hours  losartan 25 milliGRAM(s) Oral daily  metoprolol tartrate 25 milliGRAM(s) Oral two times a day  sodium chloride 0.9% lock flush 3 milliLiter(s) IV Push every 8 hours  sodium chloride 0.9%. 500 milliLiter(s) IV Continuous <Continuous>  venlafaxine XR. 150 milliGRAM(s) Oral two times a day      PAST MEDICAL & SURGICAL HISTORY:  Vertigo  Osteoarthritis (arthritis due to wear and tear of joints)  Glaucoma  Anxiety  ROSIO (obstructive sleep apnea)  Degenerative disc disease, cervical  GERD (gastroesophageal reflux disease)  Essential hypertension  Dyslipidemia  Depression  BPH (benign prostatic hypertrophy)  Morbid obesity due to excess calories  H/O lipoma: h/o excision lipoma back  H/O pilonidal cyst: h/o excision plilonidal cyst  H/O melanoma excision  H/O hernia repair  H/O laparoscopic adjustable gastric bandin- Defalted in 2017      Vitals:  T(F): 97.9 (-10), Max: 98.2 (-)  HR: 65 (-10) (54 - 74)  BP: 115/71 (-10) (102/53 - 115/71)  RR: 17 (-10)  SpO2: 99% (-10)  I&O's Summary      Physical Exam:  Appearance: No acute distress; well appearing  Eyes: PERRL, EOMI, pink conjunctiva  HENT: Normal oral mucosa  Cardiovascular: RRR, S1, S2, no murmurs, rubs, or gallops; no edema; no JVD  Respiratory: Clear to auscultation bilaterally  Gastrointestinal: soft, non-tender, non-distended with normal bowel sounds  Musculoskeletal: No clubbing; no joint deformity   Neurologic: Non-focal  Lymphatic: No lymphadenopathy  Psychiatry: AAOx3, mood & affect appropriate  Skin: No rashes, ecchymoses, or cyanosis                          15.1   6.42  )-----------( 192      ( 10 Apr 2019 06:20 )             45.4     04-10    140  |  106  |  13  ----------------------------<  121<H>  4.0   |  24  |  0.78    Ca    8.8      10 Apr 2019 06:20  Mg     2.0     04-10            Echo:  < from: Transthoracic Echocardiogram (19 @ 14:42) >  DIMENSIONS:  Dimensions:     Normal Values:  LA:     3.5 cm    2.0 - 4.0 cm  Ao:     3.1 cm    2.0 - 3.8 cm  SEPTUM: 0.8 cm    0.6 - 1.2 cm  PWT:    0.7 cm    0.6 - 1.1 cm  LVIDd:  5.1 cm    3.0 - 5.6 cm  LVIDs:  3.3 cm    1.8 - 4.0 cm  Derived Variables:  LVMI: 57 g/m2  RWT: 0.27  Fractional short: 35 %  Ejection Fraction (Teicholtz): 64 %  ------------------------------------------------------------------------  CONCLUSIONS:  1. Mitral annular calcification, otherwise normal mitral  valve. Minimal mitral regurgitation.  2. Aortic valve leaflet morphology not well visualized.  The valve is calcified. Peak transaortic valve gradient  equals 40 mm Hg, mean transaortic valve gradient equals 22  mm Hg, consistent with probable moderate aortic stenosis.  3. Normal left ventricular internal dimensions and wall  thicknesses.  4. Endocardium not well visualized; grossly normal left  ventricular systolic function.  5. The right ventricle is not well visualized; grossly  normal right ventricular systolic function.    < end of copied text >    < from: Cardiac Cath Lab - Adult (19 @ 11:58) >  CORONARY VESSELS: The coronary circulation is right dominant.  LM:   --  LM: Normal.  LAD:   --  LAD: Normal.  CX:   --  Circumflex: Normal.  RCA:   --  RCA: Normal.  COMPLICATIONS: There were no complications.  DIAGNOSTIC IMPRESSIONS: Patient has normal coronaries and mild aortic  stenosis and normal LVEDP    < end of copied text >    Interpretation of Telemetry: NSR, PVCs

## 2019-04-11 LAB
ANION GAP SERPL CALC-SCNC: 11 MMO/L — SIGNIFICANT CHANGE UP (ref 7–14)
APTT BLD: 28.9 SEC — SIGNIFICANT CHANGE UP (ref 27.5–36.3)
BASOPHILS # BLD AUTO: 0.06 K/UL — SIGNIFICANT CHANGE UP (ref 0–0.2)
BASOPHILS NFR BLD AUTO: 1.1 % — SIGNIFICANT CHANGE UP (ref 0–2)
BLD GP AB SCN SERPL QL: NEGATIVE — SIGNIFICANT CHANGE UP
BUN SERPL-MCNC: 11 MG/DL — SIGNIFICANT CHANGE UP (ref 7–23)
CALCIUM SERPL-MCNC: 8.7 MG/DL — SIGNIFICANT CHANGE UP (ref 8.4–10.5)
CHLORIDE SERPL-SCNC: 106 MMOL/L — SIGNIFICANT CHANGE UP (ref 98–107)
CO2 SERPL-SCNC: 25 MMOL/L — SIGNIFICANT CHANGE UP (ref 22–31)
CREAT SERPL-MCNC: 0.8 MG/DL — SIGNIFICANT CHANGE UP (ref 0.5–1.3)
EOSINOPHIL # BLD AUTO: 0.22 K/UL — SIGNIFICANT CHANGE UP (ref 0–0.5)
EOSINOPHIL NFR BLD AUTO: 4.1 % — SIGNIFICANT CHANGE UP (ref 0–6)
GLUCOSE SERPL-MCNC: 84 MG/DL — SIGNIFICANT CHANGE UP (ref 70–99)
HCT VFR BLD CALC: 42.3 % — SIGNIFICANT CHANGE UP (ref 39–50)
HGB BLD-MCNC: 14 G/DL — SIGNIFICANT CHANGE UP (ref 13–17)
IMM GRANULOCYTES NFR BLD AUTO: 0.6 % — SIGNIFICANT CHANGE UP (ref 0–1.5)
INR BLD: 1.05 — SIGNIFICANT CHANGE UP (ref 0.88–1.17)
LYMPHOCYTES # BLD AUTO: 2.32 K/UL — SIGNIFICANT CHANGE UP (ref 1–3.3)
LYMPHOCYTES # BLD AUTO: 43.4 % — SIGNIFICANT CHANGE UP (ref 13–44)
MAGNESIUM SERPL-MCNC: 2 MG/DL — SIGNIFICANT CHANGE UP (ref 1.6–2.6)
MCHC RBC-ENTMCNC: 31.5 PG — SIGNIFICANT CHANGE UP (ref 27–34)
MCHC RBC-ENTMCNC: 33.1 % — SIGNIFICANT CHANGE UP (ref 32–36)
MCV RBC AUTO: 95.1 FL — SIGNIFICANT CHANGE UP (ref 80–100)
MONOCYTES # BLD AUTO: 0.62 K/UL — SIGNIFICANT CHANGE UP (ref 0–0.9)
MONOCYTES NFR BLD AUTO: 11.6 % — SIGNIFICANT CHANGE UP (ref 2–14)
NEUTROPHILS # BLD AUTO: 2.09 K/UL — SIGNIFICANT CHANGE UP (ref 1.8–7.4)
NEUTROPHILS NFR BLD AUTO: 39.2 % — LOW (ref 43–77)
NRBC # FLD: 0.03 K/UL — SIGNIFICANT CHANGE UP (ref 0–0)
PLATELET # BLD AUTO: 180 K/UL — SIGNIFICANT CHANGE UP (ref 150–400)
PMV BLD: 8.9 FL — SIGNIFICANT CHANGE UP (ref 7–13)
POTASSIUM SERPL-MCNC: 4.2 MMOL/L — SIGNIFICANT CHANGE UP (ref 3.5–5.3)
POTASSIUM SERPL-SCNC: 4.2 MMOL/L — SIGNIFICANT CHANGE UP (ref 3.5–5.3)
PROTHROM AB SERPL-ACNC: 12 SEC — SIGNIFICANT CHANGE UP (ref 9.8–13.1)
RBC # BLD: 4.45 M/UL — SIGNIFICANT CHANGE UP (ref 4.2–5.8)
RBC # FLD: 12.6 % — SIGNIFICANT CHANGE UP (ref 10.3–14.5)
RH IG SCN BLD-IMP: POSITIVE — SIGNIFICANT CHANGE UP
RH IG SCN BLD-IMP: POSITIVE — SIGNIFICANT CHANGE UP
SODIUM SERPL-SCNC: 142 MMOL/L — SIGNIFICANT CHANGE UP (ref 135–145)
WBC # BLD: 5.34 K/UL — SIGNIFICANT CHANGE UP (ref 3.8–10.5)
WBC # FLD AUTO: 5.34 K/UL — SIGNIFICANT CHANGE UP (ref 3.8–10.5)

## 2019-04-11 PROCEDURE — 71045 X-RAY EXAM CHEST 1 VIEW: CPT | Mod: 26

## 2019-04-11 PROCEDURE — 93010 ELECTROCARDIOGRAM REPORT: CPT

## 2019-04-11 PROCEDURE — 33249 INSJ/RPLCMT DEFIB W/LEAD(S): CPT

## 2019-04-11 RX ORDER — ONDANSETRON 8 MG/1
4 TABLET, FILM COATED ORAL ONCE
Qty: 0 | Refills: 0 | Status: DISCONTINUED | OUTPATIENT
Start: 2019-04-11 | End: 2019-04-11

## 2019-04-11 RX ORDER — TRAMADOL HYDROCHLORIDE 50 MG/1
25 TABLET ORAL ONCE
Qty: 0 | Refills: 0 | Status: DISCONTINUED | OUTPATIENT
Start: 2019-04-11 | End: 2019-04-11

## 2019-04-11 RX ORDER — VANCOMYCIN HCL 1 G
1000 VIAL (EA) INTRAVENOUS ONCE
Qty: 0 | Refills: 0 | Status: COMPLETED | OUTPATIENT
Start: 2019-04-12 | End: 2019-04-12

## 2019-04-11 RX ADMIN — Medication 150 MILLIGRAM(S): at 06:02

## 2019-04-11 RX ADMIN — ATORVASTATIN CALCIUM 40 MILLIGRAM(S): 80 TABLET, FILM COATED ORAL at 22:17

## 2019-04-11 RX ADMIN — BUPROPION HYDROCHLORIDE 300 MILLIGRAM(S): 150 TABLET, EXTENDED RELEASE ORAL at 22:25

## 2019-04-11 RX ADMIN — SODIUM CHLORIDE 3 MILLILITER(S): 9 INJECTION INTRAMUSCULAR; INTRAVENOUS; SUBCUTANEOUS at 21:33

## 2019-04-11 RX ADMIN — SODIUM CHLORIDE 3 MILLILITER(S): 9 INJECTION INTRAMUSCULAR; INTRAVENOUS; SUBCUTANEOUS at 06:02

## 2019-04-11 RX ADMIN — HEPARIN SODIUM 5000 UNIT(S): 5000 INJECTION INTRAVENOUS; SUBCUTANEOUS at 06:05

## 2019-04-11 RX ADMIN — SODIUM CHLORIDE 3 MILLILITER(S): 9 INJECTION INTRAMUSCULAR; INTRAVENOUS; SUBCUTANEOUS at 13:28

## 2019-04-11 NOTE — CHART NOTE - NSCHARTNOTEFT_GEN_A_CORE
Type of Procedure: ICD implant  Licensed independent practitioner: Leandro Casiano MD  Assistant: None  Description of procedure: After informed consent was obtained, and in the fasting state, the patient was prepped and draped in the usual sterile fashion.  Vancomycin 1g was administered for prophylaxis.  Local anesthetic was delivered to the left pectoral region, and an incision was made in the left deltopectoral groove.  The incision was extended inward by blunt dissection and the cephalic vein was identified.  The vein was cannulated and an 9F peel away sheath was placed in the vein over a retaining wire.  A single coil active fixation lead was advanced to the right ventricle under fluoroscopic guidance and screwed in place.  Ventricular pacing and sensing thresholds were checked and were good.    Next, A 7F peel away sheath was then placed in the vein along with the ventricular lead, and a pacing lead was placed in the RAA under fluoroscopic guidance.  Atrial pacing and sensing thresholds were checked and were good.  No far field sensing by the atrial lead was noted. High output pacing from neither lead resulted in diaphragmatic pacing.  The leads were sutured to the underlying pectoralis muscle using 2-0 Ethibond suture.  Final sensing and pacing thresholds were checked and were good.  A subcutaneous pocket was created using blunt dissection, and was copiously irrigated with a saline solution containing gentamicin and vancomycin.  The leads were connected to a dual ch ICD, and the entire system was implanted into the pocket.    The subcutaneous tissues were then closed with a layer of interrupted 2-0 vicryl suture and a running layer of 2-0 vicryl suture and the skin was closed with a running 4-0 vicryl subcuticular stitch.  Steri strips were placed over the incision.  The wound was covered with a dry sterile dressing.  The patient tolerated the procedure well and left the laboratory in good condition.    Conscious sedation was provided and appropriate monitoring performed by members of the EP nursing staff and Anesthesia.  Findings of procedure: As above  Estimated blood loss: 50cc  Specimen removed: N/A  Preoperative Dx: Non-ischemic CM  Postoperative Dx: Non-ischemic CM  Complications: None  Anesthesia type: Conscious

## 2019-04-11 NOTE — PROGRESS NOTE ADULT - SUBJECTIVE AND OBJECTIVE BOX
FOLLOW UP VISIT:  SYMPTOMS:  Patient S/P AICD   comfortable   denies Cp ,Sob     MEDICATIONS  (STANDING):  atorvastatin 40 milliGRAM(s) Oral at bedtime  buPROPion XL . 300 milliGRAM(s) Oral daily  losartan 25 milliGRAM(s) Oral at bedtime  metoprolol succinate ER 25 milliGRAM(s) Oral once  sodium chloride 0.9% lock flush 3 milliLiter(s) IV Push every 8 hours  sodium chloride 0.9%. 500 milliLiter(s) (75 mL/Hr) IV Continuous <Continuous>  venlafaxine XR. 150 milliGRAM(s) Oral two times a day    MEDICATIONS  (PRN):  ondansetron Injectable 4 milliGRAM(s) IV Push once PRN Nausea and/or Vomiting      Vital Signs Last 24 Hrs  T(C): 36.6 (11 Apr 2019 20:40), Max: 36.9 (10 Apr 2019 23:05)  T(F): 97.9 (11 Apr 2019 20:40), Max: 98.4 (10 Apr 2019 23:05)  HR: 66 (11 Apr 2019 20:55) (54 - 66)  BP: 113/61 (11 Apr 2019 20:55) (89/48 - 118/54)  BP(mean): 74 (11 Apr 2019 20:55) (68 - 77)  RR: 16 (11 Apr 2019 20:55) (13 - 18)  SpO2: 96% (11 Apr 2019 20:55) (94% - 100%)    Daily     Daily     I&O's Detail      Physical Exam   obese male in NAd   Eyes: Anicteric , no injection   Neck: No sig JVD   Lungs:  CTA   Cor:  SM 3/6   AICD site dressing in Place   Abd: obese NT + BS   Ext: denies C/C/e  Pulses: +2   Neuro: AxoX3        LABS  PT/INR - ( 11 Apr 2019 06:00 )   PT: 12.0 SEC;   INR: 1.05          PTT - ( 11 Apr 2019 06:00 )  PTT:28.9 SEC        CBC Full  -  ( 11 Apr 2019 06:00 )  WBC Count : 5.34 K/uL  RBC Count : 4.45 M/uL  Hemoglobin : 14.0 g/dL  Hematocrit : 42.3 %  Platelet Count - Automated : 180 K/uL  Mean Cell Volume : 95.1 fL  Mean Cell Hemoglobin : 31.5 pg  Mean Cell Hemoglobin Concentration : 33.1 %  Auto Neutrophil # : 2.09 K/uL  Auto Lymphocyte # : 2.32 K/uL  Auto Monocyte # : 0.62 K/uL  Auto Eosinophil # : 0.22 K/uL  Auto Basophil # : 0.06 K/uL  Auto Neutrophil % : 39.2 %  Auto Lymphocyte % : 43.4 %  Auto Monocyte % : 11.6 %  Auto Eosinophil % : 4.1 %  Auto Basophil % : 1.1 %    04-11    142  |  106  |  11  ----------------------------<  84  4.2   |  25  |  0.80    Ca    8.7      11 Apr 2019 06:00  Mg     2.0     04-11      Lipid panel  TSH:  Digoxin level    ECHO    Nuclear Stress    ECG:    Telemetry: SR     RADIOLOGY & ADDITIONAL STUDIES:  Cardiac MRI     IMPRESSION:    1.  Normal left ventricular size and systolic function. No myocardial   edema. No delayed enhancement was visualized in the left ventricular   myocardium.    2.  Normal right ventricular size and systolic function. No delayed   enhancement or fatty infiltration was visualized in the right ventricular   myocardium.    3. Aortic stenosis.         ASSESSMENT & PLAN:  59 year old male with HX of Obesity ,HTN , HLD , ROSIO , Anxiety /depression & BPH . Patient with recurrent dizziness over past 5 years with one episode of syncope recently while urinating . He is S/p Gastric Sleeve 2 years ago with 100 Lbs WT loss , Recent echo with Moderate AS . . Patient was seen as out-pt with Dr Packer for recurrent dizziness , Cardiac w/u reported negative .This AM while pt on monitoring service , he triggered an event which was associated with dizziness ,tracing with NSVT @ 240 bpm ,7 second , around 25 beats ,   patient susequently sent to Er for further evaluation , He is off Flomax since last week . denies Cp ,Sob ,Palpitation ,Pedal edema , PND or ESQUIVEL   S/P AICD today   1. NSVT Symptomatic , / recurrent dizziness / one episode of Syncope    Abnormal perfusion Scan on PET Attenuation Vs Scar LVEF 62%    Cardiac Cath   normal coronaries and mild aortic  stenosis and normal LVEDP  s/p Cardiac MRI, NL LV & RV FX  No fatty infiltration No delay enhancement   S/P AICD   D/C planning      2. HTN   On  Losartan 25 mg  daily ,&  Toprol XL      3. HLD   Continue Liptor 40 qhs        4. Anxiety /depression     Continue Effexor  150 BID  & Bupropione 300 daily       5 Aortic Stenosis       S/P Cath Mild AS       6. DVT prophylaxis

## 2019-04-11 NOTE — CHART NOTE - NSCHARTNOTEFT_GEN_A_CORE
59y Male s/p ICD insertion. Patient c/o pain at site. Patient denies any numbness, tingling, CP, or SOB.     Vital Signs Last 24 Hrs  T(C): 36.6 (04-11-19 @ 23:21), Max: 36.8 (04-11-19 @ 06:00)  T(F): 97.8 (04-11-19 @ 23:21), Max: 98.3 (04-11-19 @ 12:44)  HR: 63 (04-11-19 @ 23:21) (54 - 66)  BP: 132/85 (04-11-19 @ 23:21) (96/74 - 132/85)  BP(mean): 80 (04-11-19 @ 22:15) (68 - 87)  RR: 17 (04-11-19 @ 23:21) (13 - 18)  SpO2: 99% (04-11-19 @ 23:21) (94% - 99%)    ICD site: Dressing is clear/dry/intact. No overt hematoma or bleeding. Pulses palpable.    Chest Xray (prelim): No PTX.    EKG: Sinus agapito 58 bpm.     Tramdol 25mg PO x 1 dose given for pain    Will continue to monitor.

## 2019-04-11 NOTE — CHART NOTE - NSCHARTNOTEFT_GEN_A_CORE
Brief EP Progress Note    59M with HTN, HLD, BPH, anxiety, recurrent syncope, and gastric sleeve in 2004 sent in by cardiologist for 25 beats NSVT noted on event monitor associated with dizziness. Of note, patient was seen in ED 3/21/19 for head laceration due to syncope. [facility] admission with clean coronaries. Cardiac MR without evidence of scar; however, given NSVT and syncope, will proceed with AICD.     Patient seen and examined. Written progress note and consent in paper chart.      - NPO for AICD implantation today  - Continue metoprolol succinate 25 mg daily, hold for HR<55       J. Yfn  Cardiology Fellow  [id number]  New EP consults: [id number]

## 2019-04-12 ENCOUNTER — TRANSCRIPTION ENCOUNTER (OUTPATIENT)
Age: 59
End: 2019-04-12

## 2019-04-12 VITALS
SYSTOLIC BLOOD PRESSURE: 114 MMHG | DIASTOLIC BLOOD PRESSURE: 68 MMHG | TEMPERATURE: 98 F | HEART RATE: 64 BPM | OXYGEN SATURATION: 98 % | RESPIRATION RATE: 17 BRPM

## 2019-04-12 LAB
ANION GAP SERPL CALC-SCNC: 12 MMO/L — SIGNIFICANT CHANGE UP (ref 7–14)
BUN SERPL-MCNC: 18 MG/DL — SIGNIFICANT CHANGE UP (ref 7–23)
CALCIUM SERPL-MCNC: 9.2 MG/DL — SIGNIFICANT CHANGE UP (ref 8.4–10.5)
CHLORIDE SERPL-SCNC: 102 MMOL/L — SIGNIFICANT CHANGE UP (ref 98–107)
CO2 SERPL-SCNC: 23 MMOL/L — SIGNIFICANT CHANGE UP (ref 22–31)
CREAT SERPL-MCNC: 0.76 MG/DL — SIGNIFICANT CHANGE UP (ref 0.5–1.3)
GLUCOSE SERPL-MCNC: 106 MG/DL — HIGH (ref 70–99)
HCT VFR BLD CALC: 43.6 % — SIGNIFICANT CHANGE UP (ref 39–50)
HGB BLD-MCNC: 14.5 G/DL — SIGNIFICANT CHANGE UP (ref 13–17)
MAGNESIUM SERPL-MCNC: 1.8 MG/DL — SIGNIFICANT CHANGE UP (ref 1.6–2.6)
MCHC RBC-ENTMCNC: 31.9 PG — SIGNIFICANT CHANGE UP (ref 27–34)
MCHC RBC-ENTMCNC: 33.3 % — SIGNIFICANT CHANGE UP (ref 32–36)
MCV RBC AUTO: 95.8 FL — SIGNIFICANT CHANGE UP (ref 80–100)
NRBC # FLD: 0 K/UL — SIGNIFICANT CHANGE UP (ref 0–0)
PLATELET # BLD AUTO: 179 K/UL — SIGNIFICANT CHANGE UP (ref 150–400)
PMV BLD: 8.8 FL — SIGNIFICANT CHANGE UP (ref 7–13)
POTASSIUM SERPL-MCNC: 4.1 MMOL/L — SIGNIFICANT CHANGE UP (ref 3.5–5.3)
POTASSIUM SERPL-SCNC: 4.1 MMOL/L — SIGNIFICANT CHANGE UP (ref 3.5–5.3)
RBC # BLD: 4.55 M/UL — SIGNIFICANT CHANGE UP (ref 4.2–5.8)
RBC # FLD: 12.3 % — SIGNIFICANT CHANGE UP (ref 10.3–14.5)
SODIUM SERPL-SCNC: 137 MMOL/L — SIGNIFICANT CHANGE UP (ref 135–145)
WBC # BLD: 8.78 K/UL — SIGNIFICANT CHANGE UP (ref 3.8–10.5)
WBC # FLD AUTO: 8.78 K/UL — SIGNIFICANT CHANGE UP (ref 3.8–10.5)

## 2019-04-12 PROCEDURE — 99232 SBSQ HOSP IP/OBS MODERATE 35: CPT

## 2019-04-12 RX ORDER — BUPROPION HYDROCHLORIDE 150 MG/1
1 TABLET, EXTENDED RELEASE ORAL
Qty: 0 | Refills: 0 | COMMUNITY
Start: 2019-04-12

## 2019-04-12 RX ORDER — FLUOXETINE HCL 10 MG
10 CAPSULE ORAL
Qty: 0 | Refills: 0 | COMMUNITY

## 2019-04-12 RX ORDER — VENLAFAXINE HCL 75 MG
1 CAPSULE, EXT RELEASE 24 HR ORAL
Qty: 0 | Refills: 0 | COMMUNITY
Start: 2019-04-12

## 2019-04-12 RX ORDER — METOPROLOL TARTRATE 50 MG
1 TABLET ORAL
Qty: 30 | Refills: 0 | OUTPATIENT
Start: 2019-04-12 | End: 2019-05-11

## 2019-04-12 RX ORDER — OLMESARTAN MEDOXOMIL 5 MG/1
1 TABLET, FILM COATED ORAL
Qty: 0 | Refills: 0 | COMMUNITY

## 2019-04-12 RX ORDER — LOSARTAN POTASSIUM 100 MG/1
1 TABLET, FILM COATED ORAL
Qty: 30 | Refills: 0 | OUTPATIENT
Start: 2019-04-12 | End: 2019-05-11

## 2019-04-12 RX ORDER — ATORVASTATIN CALCIUM 80 MG/1
1 TABLET, FILM COATED ORAL
Qty: 0 | Refills: 0 | COMMUNITY

## 2019-04-12 RX ORDER — ATORVASTATIN CALCIUM 80 MG/1
1 TABLET, FILM COATED ORAL
Qty: 30 | Refills: 0 | OUTPATIENT
Start: 2019-04-12 | End: 2019-05-11

## 2019-04-12 RX ADMIN — Medication 150 MILLIGRAM(S): at 05:09

## 2019-04-12 RX ADMIN — SODIUM CHLORIDE 3 MILLILITER(S): 9 INJECTION INTRAMUSCULAR; INTRAVENOUS; SUBCUTANEOUS at 05:10

## 2019-04-12 RX ADMIN — Medication 250 MILLIGRAM(S): at 06:32

## 2019-04-12 NOTE — DISCHARGE NOTE PROVIDER - NSDCCPCAREPLAN_GEN_ALL_CORE_FT
PRINCIPAL DISCHARGE DIAGNOSIS  Diagnosis: NSVT (nonsustained ventricular tachycardia)  Assessment and Plan of Treatment: Patient was admitted with symptomatic NSVT and recurrent dizziness. Was seen by the EP team and underwent a cardiac cath which showed normal coronaries and mild aortic stenosis.  Patient underwent a cardiac MRI, which did not show evidence of scar. Given NSVT noted on telemetry and recurrent syncope, decision was made to proceed with AICD. Patient is s/p procedure without complications, no pneumothorax noted on CXR. Follow up with electrophysiology team within 1-2 weeks of discharge. You have an appt 4/29/19 at 9:30am      SECONDARY DISCHARGE DIAGNOSES  Diagnosis: Essential hypertension  Assessment and Plan of Treatment: Low sodium and fat diet, continue anti-hypertensive medications, and follow up with primary care physician.

## 2019-04-12 NOTE — DISCHARGE NOTE NURSING/CASE MANAGEMENT/SOCIAL WORK - NSDCPEEMAIL_GEN_ALL_CORE
[facility] email [email or web address]

## 2019-04-12 NOTE — DISCHARGE NOTE NURSING/CASE MANAGEMENT/SOCIAL WORK - NSDCDPATPORTLINK_GEN_ALL_CORE
You can access the [facility], offered by [facility], by registering with the following website: [email or web address]

## 2019-04-12 NOTE — PROGRESS NOTE ADULT - SUBJECTIVE AND OBJECTIVE BOX
Patient seen and evaluated.  No significant events overnight.  Telemetry demonstrates NSR at 79 bpm.  s/p dual chamber [facility] ICD implant on 4/11/2019 with Dr. Casiano.  Feels generally well.  Left sided device site is healing well; without ecchymosis, drainage, hematoma, erythema, or swelling.  CXR was reviewed demonstrating good RV lead placement, no pneumothorax.  Teaching provided regarding arm restrictions, site care, and showering.     The temporary ID card, ICD booklet, and wound check letter were left in the care of the patient and his family.  Outpatient follow up with Dr. Casiano on 26-Apr-2019 09:15.

## 2019-04-12 NOTE — DISCHARGE NOTE PROVIDER - NSDCFUADDAPPT_GEN_ALL_CORE_FT
Follow up with PCP within 1-2 weeks of discharge.   Follow up with electrophysiology team within 1-2 weeks of discharge. You have an appointment on 4/29/19 at 9:30am.   Follow up with cardiology within 1-2 weeks of discharge.

## 2019-04-12 NOTE — DISCHARGE NOTE PROVIDER - CARE PROVIDER_API CALL
Leandro Casiano)  Cardiac Electrophysiology; Cardiology; Internal Medicine  [address] [place] [address]  Phone: [phone]  Fax: [phone]  Follow Up Time:     Terrence Vargas)  Medicine  [address] [place] [address]  Phone: [phone]  Fax: [phone]  Follow Up Time:

## 2019-04-12 NOTE — PROGRESS NOTE ADULT - SUBJECTIVE AND OBJECTIVE BOX
Anesthesia Post Op Note    59y Male POD#1 s/p AICD placement.  No anesthetic complaints.    T(C): 36.7 (04-12-19 @ 05:07), Max: 36.8 (04-11-19 @ 12:44)  HR: 64 (04-12-19 @ 05:07) (54 - 66)  BP: 114/68 (04-12-19 @ 05:07) (96/74 - 132/85)  RR: 17 (04-12-19 @ 05:07) (13 - 18)  SpO2: 98% (04-12-19 @ 05:07) (94% - 99%)    History of alcohol abuse (Other)  penicillin (Hives)  tetracycline (Angioedema)      atorvastatin 40 milliGRAM(s) Oral at bedtime  buPROPion XL . 300 milliGRAM(s) Oral daily  losartan 25 milliGRAM(s) Oral at bedtime  metoprolol succinate ER 25 milliGRAM(s) Oral once  sodium chloride 0.9% lock flush 3 milliLiter(s) IV Push every 8 hours  sodium chloride 0.9%. 500 milliLiter(s) IV Continuous <Continuous>  venlafaxine XR. 150 milliGRAM(s) Oral two times a day

## 2019-04-12 NOTE — DISCHARGE NOTE PROVIDER - HOSPITAL COURSE
59M with HTN, HLD, BPH, anxiety, recurrent syncope, and gastric sleeve in 2004 sent in by cardiologist for 25 beats NSVT noted on event monitor associated with dizziness. Of note, patient was seen in ED 3/21/19 for head laceration due to syncope. [facility] this admission with clean coronaries. Cardiac MR without evidence of scar; however, given NSVT and syncope, will proceed with AICD.         1. NSVT: Admitted with symptomatic NSVT and recurrent dizziness. Was seen by the EP team and underwent a cardiac cath which showed normal coronaries and mild aortic stenosis.  Patient underwent a cardiac MRI, which did not show evidence of scar. Given NSVT noted on telemetry and recurrent syncope, decision was made to proceed with AICD. Patient is s/p procedure without complications, no pneumothorax noted on CXR.     2. HTN: Continue Losartan and Toprol.     3. HLD: Continue Lipitor     4. Anxiety: Continue effexor and bupropione     5. Aortic stenosis: cath with mild AS, outpatient followup.         Patient seen and evaluated, no acute somatic complaints. Medically cleared/stable for discharge as per Dr. Vargas with close outpatient follow up within 1-2 weeks of discharge. Patient understands and agrees with plan of care.

## 2019-04-12 NOTE — PROGRESS NOTE ADULT - PROVIDER SPECIALTY LIST ADULT
Anesthesia
Cardiology
Electrophysiology
Cardiology

## 2019-04-26 ENCOUNTER — APPOINTMENT (OUTPATIENT)
Dept: ELECTROPHYSIOLOGY | Facility: CLINIC | Age: 59
End: 2019-04-26
Payer: COMMERCIAL

## 2019-04-26 VITALS — DIASTOLIC BLOOD PRESSURE: 69 MMHG | SYSTOLIC BLOOD PRESSURE: 112 MMHG | HEART RATE: 62 BPM | RESPIRATION RATE: 14 BRPM

## 2019-04-26 PROCEDURE — 99024 POSTOP FOLLOW-UP VISIT: CPT

## 2019-04-26 RX ORDER — FOLIC ACID 1 MG/1
1 TABLET ORAL
Refills: 0 | Status: ACTIVE | COMMUNITY

## 2019-04-26 RX ORDER — OLMESARTAN MEDOXOMIL 40 MG/1
TABLET, FILM COATED ORAL
Refills: 0 | Status: DISCONTINUED | COMMUNITY
End: 2019-04-26

## 2019-05-06 ENCOUNTER — TRANSCRIPTION ENCOUNTER (OUTPATIENT)
Age: 59
End: 2019-05-06

## 2019-05-06 ENCOUNTER — APPOINTMENT (OUTPATIENT)
Dept: ELECTROPHYSIOLOGY | Facility: CLINIC | Age: 59
End: 2019-05-06
Payer: COMMERCIAL

## 2019-05-06 VITALS
OXYGEN SATURATION: 98 % | HEART RATE: 55 BPM | SYSTOLIC BLOOD PRESSURE: 116 MMHG | WEIGHT: 259 LBS | BODY MASS INDEX: 37.08 KG/M2 | DIASTOLIC BLOOD PRESSURE: 75 MMHG | HEIGHT: 70 IN

## 2019-05-06 PROCEDURE — 99024 POSTOP FOLLOW-UP VISIT: CPT

## 2019-05-06 PROCEDURE — 93000 ELECTROCARDIOGRAM COMPLETE: CPT

## 2019-05-06 NOTE — HISTORY OF PRESENT ILLNESS
[FreeTextEntry1] : Terrence Vargas MD\par \par Ildefonso Medina is a 60y/o man with Hx of HTN, HLD, BPH, all of which are stable, ROSIO, anxiety, ETOH abuse, obesity s/p gastric sleeve in 2004, and recurring syncope with noted NSVT associated with dizziness s/p ICD placement on 4/11/2019 who presents today for routine f/u. Admits still experiencing recurring episodes of dizziness. Denies chest pain, palpitations, SOB, syncope or near syncope.

## 2019-05-06 NOTE — DISCUSSION/SUMMARY
[FreeTextEntry1] : Ildefonso Medina is a 60y/o man with Hx of HTN, HLD, BPH, all of which are stable, ROSIO, anxiety, ETOH abuse, obesity s/p gastric sleeve in 2004, and recurring syncope with noted NSVT associated with dizziness s/p ICD placement on 4/11/2019 who presents today for routine f/u. \par \par Impression:\par \par 1. VT: recurring syncope and noted VT with associated dizzines. Now s/p ICD placement. Quick check of ICD reveals no evidence of tachyarrhythmias. Isolated PVCs. Maintained on metoprolol 50mg daily. Changed VT monitoring zone to 130bpm to detect possible slow VT associated with symptoms of dizziness. Continue regular f/u in device as scheduled. \par \par 2. HTN: resume oral antihypertensives as prescribed. Encouraged heart healthy diet, sodium restriction, and weight loss. Continue regular f/u with Cardiologist for further HTN management.\par \par 3. HLD: resume statin therapy as prescribed and regular f/u with Cardiologist for routine lipid monitoring and management.\par \par RTO for f/u in 3 months.

## 2019-05-06 NOTE — PHYSICAL EXAM
[General Appearance - Well Developed] : well developed [Normal Appearance] : normal appearance [Well Groomed] : well groomed [General Appearance - Well Nourished] : well nourished [No Deformities] : no deformities [General Appearance - In No Acute Distress] : no acute distress [Normal Conjunctiva] : the conjunctiva exhibited no abnormalities [Eyelids - No Xanthelasma] : the eyelids demonstrated no xanthelasmas [No Oral Pallor] : no oral pallor [Normal Oral Mucosa] : normal oral mucosa [No Oral Cyanosis] : no oral cyanosis [Normal Jugular Venous A Waves Present] : normal jugular venous A waves present [Normal Jugular Venous V Waves Present] : normal jugular venous V waves present [No Jugular Venous Trent A Waves] : no jugular venous trent A waves [Heart Sounds] : normal S1 and S2 [Heart Rate And Rhythm] : heart rate and rhythm were normal [Murmurs] : no murmurs present [Exaggerated Use Of Accessory Muscles For Inspiration] : no accessory muscle use [Respiration, Rhythm And Depth] : normal respiratory rhythm and effort [Auscultation Breath Sounds / Voice Sounds] : lungs were clear to auscultation bilaterally [Abdomen Soft] : soft [Abdomen Tenderness] : non-tender [Abdomen Mass (___ Cm)] : no abdominal mass palpated [Gait - Sufficient For Exercise Testing] : the gait was sufficient for exercise testing [Abnormal Walk] : normal gait [Nail Clubbing] : no clubbing of the fingernails [Petechial Hemorrhages (___cm)] : no petechial hemorrhages [Cyanosis, Localized] : no localized cyanosis [Skin Color & Pigmentation] : normal skin color and pigmentation [] : no rash [No Venous Stasis] : no venous stasis [Skin Lesions] : no skin lesions [No Skin Ulcers] : no skin ulcer [No Xanthoma] : no  xanthoma was observed [Affect] : the affect was normal [Oriented To Time, Place, And Person] : oriented to person, place, and time [Mood] : the mood was normal [No Anxiety] : not feeling anxious

## 2019-05-15 ENCOUNTER — NON-APPOINTMENT (OUTPATIENT)
Age: 59
End: 2019-05-15

## 2019-06-24 ENCOUNTER — APPOINTMENT (OUTPATIENT)
Dept: ELECTROPHYSIOLOGY | Facility: CLINIC | Age: 59
End: 2019-06-24
Payer: COMMERCIAL

## 2019-06-24 PROCEDURE — 93283 PRGRMG EVAL IMPLANTABLE DFB: CPT

## 2019-06-24 RX ORDER — VENLAFAXINE 37.5 MG/1
TABLET ORAL
Refills: 0 | Status: ACTIVE | COMMUNITY

## 2019-06-24 RX ORDER — LOSARTAN POTASSIUM 50 MG/1
50 TABLET, FILM COATED ORAL
Refills: 0 | Status: DISCONTINUED | COMMUNITY
End: 2019-06-24

## 2019-06-24 RX ORDER — BUPROPION HYDROCHLORIDE 100 MG/1
100 TABLET, FILM COATED ORAL
Refills: 0 | Status: ACTIVE | COMMUNITY

## 2019-07-25 ENCOUNTER — APPOINTMENT (OUTPATIENT)
Dept: ELECTROPHYSIOLOGY | Facility: CLINIC | Age: 59
End: 2019-07-25
Payer: COMMERCIAL

## 2019-07-25 VITALS
HEIGHT: 70 IN | DIASTOLIC BLOOD PRESSURE: 82 MMHG | HEART RATE: 59 BPM | SYSTOLIC BLOOD PRESSURE: 132 MMHG | BODY MASS INDEX: 37.37 KG/M2 | OXYGEN SATURATION: 97 % | WEIGHT: 261 LBS

## 2019-07-25 DIAGNOSIS — R55 SYNCOPE AND COLLAPSE: ICD-10-CM

## 2019-07-25 PROCEDURE — 99213 OFFICE O/P EST LOW 20 MIN: CPT

## 2019-07-25 PROCEDURE — 93000 ELECTROCARDIOGRAM COMPLETE: CPT

## 2019-07-25 NOTE — DISCUSSION/SUMMARY
[FreeTextEntry1] : Ildefonso Medina is a 60y/o man with Hx of HTN, HLD, BPH, all of which are stable, ROSIO, anxiety, ETOH abuse, obesity s/p gastric sleeve in 2004, and recurring syncope with noted NSVT associated with dizziness s/p ICD placement on 4/11/2019 who presents today for routine f/u. \par \par Impression:\par \par 1. VT: Hx of recurring syncope and noted VT with associated dizziness. Now s/p ICD placement. Quick check of ICD reveals no evidence of NT or NSVT. Maintained on metoprolol 50mg daily. On prior visit, changed VT monitoring zone to 130bpm to detect possible slow VT associated with symptoms of dizziness. Will keep current setting at this time. Continue regular f/u in device as scheduled. \par \par 2. HTN: resume oral antihypertensives as prescribed. Encouraged heart healthy diet, sodium restriction, and weight loss. Continue regular f/u with Cardiologist for further HTN management.\par \par 3. HLD: resume statin therapy as prescribed and regular f/u with Cardiologist for routine lipid monitoring and management.\par \par 4. SVT: episodes of AT/SVT noted on device check but do not correlate with patient symptoms. Resume beta blockers as prescribed. \par \par 5. Dizziness: recurring dizziness may be secondary to sensitive vagal tone. Will increase lower rate on ICD to 70bpm to avoid symptomatic bradycardia. Also turned rate smoothing on to hopefully prevent future vasovagal response. \par \par Continue f/u with Cardiologist, routine f/u in device, and RTO for f/u in 6 months or sooner if symptoms persist or change. \par

## 2019-07-25 NOTE — HISTORY OF PRESENT ILLNESS
[FreeTextEntry1] : Terrence Vargas MD\par \par Ildefonso Medina is a 60y/o man with Hx of HTN, HLD, BPH, all of which are stable, ROSIO, anxiety, ETOH abuse, obesity s/p gastric sleeve in 2004, and recurring syncope with noted NSVT associated with dizziness s/p ICD placement on 4/11/2019 who presents today for routine f/u. Still having episodes of dizziness, every 2-3 days. Particularly bad episode on July 12. Utilizes his Apple watch which recorded heart rates up to the 130s and 140s. No events correlating on quick check of device. Did have multiple episodes of SVT on 7/19/2019 but does not recall a particularly symptomatic day at that time. Has been increasing activity, going to the gym and doing stretching and aerobics in the pool. Denies chest pain, palpitations, SOB, syncope or near syncope.

## 2019-07-25 NOTE — PHYSICAL EXAM
[General Appearance - Well Developed] : well developed [Normal Appearance] : normal appearance [Well Groomed] : well groomed [General Appearance - Well Nourished] : well nourished [No Deformities] : no deformities [General Appearance - In No Acute Distress] : no acute distress [Normal Conjunctiva] : the conjunctiva exhibited no abnormalities [Eyelids - No Xanthelasma] : the eyelids demonstrated no xanthelasmas [Normal Oral Mucosa] : normal oral mucosa [No Oral Pallor] : no oral pallor [No Oral Cyanosis] : no oral cyanosis [Normal Jugular Venous A Waves Present] : normal jugular venous A waves present [Normal Jugular Venous V Waves Present] : normal jugular venous V waves present [No Jugular Venous Trent A Waves] : no jugular venous trent A waves [Respiration, Rhythm And Depth] : normal respiratory rhythm and effort [Exaggerated Use Of Accessory Muscles For Inspiration] : no accessory muscle use [Auscultation Breath Sounds / Voice Sounds] : lungs were clear to auscultation bilaterally [Heart Rate And Rhythm] : heart rate and rhythm were normal [Heart Sounds] : normal S1 and S2 [Murmurs] : no murmurs present [Abdomen Soft] : soft [Abdomen Tenderness] : non-tender [Abdomen Mass (___ Cm)] : no abdominal mass palpated [Abnormal Walk] : normal gait [Gait - Sufficient For Exercise Testing] : the gait was sufficient for exercise testing [Nail Clubbing] : no clubbing of the fingernails [Cyanosis, Localized] : no localized cyanosis [Petechial Hemorrhages (___cm)] : no petechial hemorrhages [Skin Color & Pigmentation] : normal skin color and pigmentation [] : no rash [No Venous Stasis] : no venous stasis [Skin Lesions] : no skin lesions [No Skin Ulcers] : no skin ulcer [No Xanthoma] : no  xanthoma was observed [Oriented To Time, Place, And Person] : oriented to person, place, and time [Affect] : the affect was normal [Mood] : the mood was normal [No Anxiety] : not feeling anxious

## 2019-07-28 ENCOUNTER — NON-APPOINTMENT (OUTPATIENT)
Age: 59
End: 2019-07-28

## 2019-08-22 ENCOUNTER — APPOINTMENT (OUTPATIENT)
Dept: ELECTROPHYSIOLOGY | Facility: CLINIC | Age: 59
End: 2019-08-22
Payer: COMMERCIAL

## 2019-08-22 VITALS
HEART RATE: 71 BPM | DIASTOLIC BLOOD PRESSURE: 82 MMHG | OXYGEN SATURATION: 98 % | RESPIRATION RATE: 14 BRPM | SYSTOLIC BLOOD PRESSURE: 119 MMHG | WEIGHT: 261 LBS | HEIGHT: 70 IN | BODY MASS INDEX: 37.37 KG/M2

## 2019-08-22 DIAGNOSIS — R42 DIZZINESS AND GIDDINESS: ICD-10-CM

## 2019-08-22 PROCEDURE — 99214 OFFICE O/P EST MOD 30 MIN: CPT

## 2019-08-22 PROCEDURE — 93000 ELECTROCARDIOGRAM COMPLETE: CPT

## 2019-08-22 NOTE — DISCUSSION/SUMMARY
[FreeTextEntry1] : Ildefonso Medina is a 60y/o man with Hx of HTN, HLD, BPH, all of which are stable, ROSIO, anxiety, ETOH abuse, obesity s/p gastric sleeve in 2004, and recurring syncope with noted NSVT associated with dizziness s/p ICD placement on 4/11/2019 who presents today for routine f/u. \par \par Impression:\par \par 1. VT: Hx of recurring syncope and noted VT with associated dizziness. Now s/p ICD placement. Quick check of ICD reveals no evidence of VT or NSVT. Maintained on metoprolol 50mg daily.  Continue regular f/u in device as scheduled. \par \par 2. HTN: resume oral antihypertensives as prescribed. Encouraged heart healthy diet, sodium restriction, and weight loss. Continue regular f/u with Cardiologist for further HTN management.\par \par 3. HLD: resume statin therapy as prescribed and regular f/u with Cardiologist for routine lipid monitoring and management.\par \par 4. Dizziness: recurring dizziness and tinnitus, may be secondary to vertigo. Not currently on meclizine. Follows closely with Neurology. Will discuss need for medication vs physical therapy. \par \par Continue f/u with Cardiologist, routine f/u in device, and RTO for f/u in 6 months or sooner if symptoms persist or change.

## 2019-08-22 NOTE — HISTORY OF PRESENT ILLNESS
[FreeTextEntry1] : Terrence Vargas MD\par \par Ildefonso Medina is a 58y/o man with Hx of HTN, HLD, BPH, all of which are stable, ROSIO, anxiety, ETOH abuse, obesity s/p gastric sleeve in 2004, and recurring syncope with noted NSVT associated with dizziness s/p ICD placement on 4/11/2019 who presents today for routine f/u. On last visit, we increased lower rate on ICD to 70bpm to avoid symptomatic bradycardia. Also turned rate smoothing on to hopefully prevent future vasovagal response. Still having episodes of dizziness, every 2-3 days. Also notes some tinnitus. Following with Neurology. No real change since device was adjusted. Has been increasing activity, going to the gym and doing stretching and aerobics in the pool. Denies chest pain, palpitations, SOB, syncope or near syncope.

## 2019-08-28 ENCOUNTER — NON-APPOINTMENT (OUTPATIENT)
Age: 59
End: 2019-08-28

## 2019-09-26 ENCOUNTER — NON-APPOINTMENT (OUTPATIENT)
Age: 59
End: 2019-09-26

## 2019-09-26 ENCOUNTER — APPOINTMENT (OUTPATIENT)
Dept: ELECTROPHYSIOLOGY | Facility: CLINIC | Age: 59
End: 2019-09-26
Payer: COMMERCIAL

## 2019-09-26 VITALS
HEIGHT: 70 IN | DIASTOLIC BLOOD PRESSURE: 75 MMHG | HEART RATE: 71 BPM | OXYGEN SATURATION: 98 % | SYSTOLIC BLOOD PRESSURE: 108 MMHG | WEIGHT: 260 LBS | BODY MASS INDEX: 37.22 KG/M2

## 2019-09-26 VITALS — DIASTOLIC BLOOD PRESSURE: 70 MMHG | SYSTOLIC BLOOD PRESSURE: 98 MMHG | HEART RATE: 70 BPM

## 2019-09-26 DIAGNOSIS — I47.1 SUPRAVENTRICULAR TACHYCARDIA: ICD-10-CM

## 2019-09-26 DIAGNOSIS — I47.2 VENTRICULAR TACHYCARDIA: ICD-10-CM

## 2019-09-26 PROCEDURE — 99213 OFFICE O/P EST LOW 20 MIN: CPT

## 2019-09-26 PROCEDURE — 93283 PRGRMG EVAL IMPLANTABLE DFB: CPT

## 2019-09-26 NOTE — PHYSICAL EXAM
[General Appearance - Well Developed] : well developed [Normal Appearance] : normal appearance [Well Groomed] : well groomed [General Appearance - Well Nourished] : well nourished [No Deformities] : no deformities [General Appearance - In No Acute Distress] : no acute distress [Normal Conjunctiva] : the conjunctiva exhibited no abnormalities [Eyelids - No Xanthelasma] : the eyelids demonstrated no xanthelasmas [Normal Oral Mucosa] : normal oral mucosa [No Oral Pallor] : no oral pallor [No Oral Cyanosis] : no oral cyanosis [Normal Jugular Venous A Waves Present] : normal jugular venous A waves present [Normal Jugular Venous V Waves Present] : normal jugular venous V waves present [No Jugular Venous Trent A Waves] : no jugular venous trent A waves [Respiration, Rhythm And Depth] : normal respiratory rhythm and effort [Exaggerated Use Of Accessory Muscles For Inspiration] : no accessory muscle use [Auscultation Breath Sounds / Voice Sounds] : lungs were clear to auscultation bilaterally [Heart Rate And Rhythm] : heart rate and rhythm were normal [Heart Sounds] : normal S1 and S2 [Murmurs] : no murmurs present [Abdomen Soft] : soft [Abdomen Tenderness] : non-tender [Abdomen Mass (___ Cm)] : no abdominal mass palpated [Abnormal Walk] : normal gait [Gait - Sufficient For Exercise Testing] : the gait was sufficient for exercise testing [Nail Clubbing] : no clubbing of the fingernails [Cyanosis, Localized] : no localized cyanosis [Petechial Hemorrhages (___cm)] : no petechial hemorrhages [Skin Color & Pigmentation] : normal skin color and pigmentation [] : no rash [No Venous Stasis] : no venous stasis [Skin Lesions] : no skin lesions [No Skin Ulcers] : no skin ulcer [No Xanthoma] : no  xanthoma was observed [Oriented To Time, Place, And Person] : oriented to person, place, and time [Mood] : the mood was normal [Affect] : the affect was normal [No Anxiety] : not feeling anxious

## 2019-09-29 NOTE — HISTORY OF PRESENT ILLNESS
[FreeTextEntry1] : Terrence Vargas MD\par \par Ildefonso Medina is a 58y/o man with Hx of HTN, HLD, BPH, all of which are stable, ROSIO, anxiety, ETOH abuse, obesity s/p gastric sleeve in 2004, and recurring syncope with noted NSVT associated with dizziness s/p ICD placement on 4/11/2019 who presents today for c/o episodes of palpitations. ICD interrogation reeals short runs of AT.  Denies chest pain or sob.

## 2019-09-29 NOTE — DISCUSSION/SUMMARY
[FreeTextEntry1] : Ildefonso Medina is a 58y/o man with Hx of HTN, HLD, BPH, all of which are stable, ROSIO, anxiety, ETOH abuse, obesity s/p gastric sleeve in 2004, and recurring syncope with noted NSVT associated with dizziness s/p ICD placement on 4/11/2019 who presents today with new onset of short episodes of AT.\par \par 1. VT s/p ICD- doing well, ICD interrogation reveals short runs of AT. \par 2. AT- will increase beta blockers to now take 25mg a night in addition to the 50mg in the AM.\par 3. HTN: resume oral antihypertensives as prescribed. Encouraged heart healthy diet, sodium restriction, and weight loss. Continue regular f/u with Cardiologist for further HTN management.\par 4. HLD: resume statin therapy as prescribed and regular f/u with Cardiologist for routine lipid monitoring and management.\par \par Will follow.

## 2019-10-02 ENCOUNTER — TRANSCRIPTION ENCOUNTER (OUTPATIENT)
Age: 59
End: 2019-10-02

## 2019-10-04 ENCOUNTER — APPOINTMENT (OUTPATIENT)
Dept: ORTHOPEDIC SURGERY | Facility: CLINIC | Age: 59
End: 2019-10-04
Payer: COMMERCIAL

## 2019-10-04 VITALS
HEART RATE: 73 BPM | SYSTOLIC BLOOD PRESSURE: 116 MMHG | DIASTOLIC BLOOD PRESSURE: 78 MMHG | HEIGHT: 70 IN | WEIGHT: 252 LBS | BODY MASS INDEX: 36.08 KG/M2

## 2019-10-04 DIAGNOSIS — Z86.39 PERSONAL HISTORY OF OTHER ENDOCRINE, NUTRITIONAL AND METABOLIC DISEASE: ICD-10-CM

## 2019-10-04 DIAGNOSIS — Z80.9 FAMILY HISTORY OF MALIGNANT NEOPLASM, UNSPECIFIED: ICD-10-CM

## 2019-10-04 DIAGNOSIS — Z87.39 PERSONAL HISTORY OF OTHER DISEASES OF THE MUSCULOSKELETAL SYSTEM AND CONNECTIVE TISSUE: ICD-10-CM

## 2019-10-04 DIAGNOSIS — Z86.79 PERSONAL HISTORY OF OTHER DISEASES OF THE CIRCULATORY SYSTEM: ICD-10-CM

## 2019-10-04 DIAGNOSIS — Z82.61 FAMILY HISTORY OF ARTHRITIS: ICD-10-CM

## 2019-10-04 DIAGNOSIS — S92.425A NONDISPLACED FRACTURE OF DISTAL PHALANX OF LEFT GREAT TOE, INITIAL ENCOUNTER FOR CLOSED FRACTURE: ICD-10-CM

## 2019-10-04 PROCEDURE — 99204 OFFICE O/P NEW MOD 45 MIN: CPT

## 2019-10-04 RX ORDER — LOSARTAN POTASSIUM 100 MG/1
TABLET, FILM COATED ORAL
Refills: 0 | Status: ACTIVE | COMMUNITY

## 2019-10-04 NOTE — DISCUSSION/SUMMARY
[de-identified] : Discussed findings of today's exam and possible causes of patient's pain.  Educated patient on their diagnosis of left great toe a nondisplaced distal phalanx fracture.  Reviewed possible courses of treatment, and we collaboratively decided best course of treatment at this time will include conservative management.  Patient does not have much pain, he feels comfortable in his wide toebox shoes, he does not require further immobilization in a postop shoe or a Cam Walker boot as long as he is comfortable in his regular footwear. Advised patient he can take 4-8 weeks for this issue to resolve, he is a current smoker, this may delay healing of this distal fracture.  Follow up as needed.  Patient appreciates and agrees with current plan.\par \par This note was generated using dragon medical dictation software.  A reasonable effort has been made for proofreading its contents, but typos may still remain.  If there are any questions or points of clarification needed please notify my office.\par

## 2019-10-04 NOTE — HISTORY OF PRESENT ILLNESS
[de-identified] : Patient is here for left foot first digit pain that began on 10/1/19 when he kicked a door. He went to urgent care the next day where xrays were taken which were positive for lucency consistent with fracture over the area of most pain. He used Arnica cream to help with the bruising and pain. He has been wearing his normal footwear and is able to ambulate with minimal difficulty. He otf taped the toe the first day but has not since. He has injured this toe in the past, and has chronic pain proximally on the digit. \par \par The patient's past medical history, past surgical history, medications and allergies were reviewed by me today and documented accordingly. In addition, the patient's family and social history, which were noncontributory to this visit, were reviewed also. The patient has no family history of arthritis.

## 2019-10-04 NOTE — PHYSICAL EXAM
[de-identified] : Constitutional: Well-nourished, well-developed, No acute distress\par Respiratory:  Good respiratory effort, no SOB\par Lymphatic: No regional lymphadenopathy, no lymphedema\par Psychiatric: Pleasant and normal affect, alert and oriented x3\par Skin: Clean dry and intact B/L UE/LE\par Musculoskeletal: normal except where as noted in regional exam\par \par \par Right Foot:\par APPEARANCE: no marked deformities, no swelling or malalignment\par POSITIVE TENDERNESS: none\par PULSES: 2+ DP/PT pulses\par NONTENDER: 5th metatarsal base, cuboid, 1st MTP, dorsum & plantar surfaces, medial heel, mid heel. \par ROM: normal throughout foot, ankle, and digits. \par RESISTIVE TESTING: painless flex/ext, abd/add of all digits. \par SPECIAL TESTS:  neg Tinel's at tarsal tunnel. \par B/L Knees: No asymmetry, malalignment, or swelling, Full ROM, 5/5 strength in Flex/Ext, Joints stable\par B/L Ankles: No asymmetry, malalignment, or swelling, Full ROM, 5/5 strength in DF/PF/Inv/Ev, Joints stable\par \par \par Left Foot:\par APPEARANCE: + swelling over great toe IP joint, no marked deformities or malalignment\par POSITIVE TENDERNESS: + TTP great toe distal phalanx and IP joint\par NONTENDER: 5th metatarsal base, cuboid, 1st MTP, dorsum & plantar surfaces, medial heel, mid heel. \par ROM: Limited great toe flexion/extension due to stiffness/pain, otherwise normal throughout foot, ankle, and digits. \par RESISTIVE TESTING: + mild pain with flex/ext, abd/add of all digits. \par SPECIAL TESTS:  neg Tinel's at tarsal tunnel. \par NEURO: Normal sensation of LE, DTRs 2+/4 patella and achilles\par PULSES: 2+ DP/PT pulses\par \par \par  [de-identified] : I reviewed and clinically correlated the following outside imaging studies,\par \par Procedure was performed at the [facility] [place] [facility] [place] \par \par EXAM: FOOT MIN 3 VIEWS LEFT \par \par \par PROCEDURE DATE: 10/02/2019 \par \par \par INTERPRETATION: 3 views of the left foot. \par \par Clinical indications: Left foot pain \par \par IMPRESSION: Linear lucencies are seen at the fibular side base of the great \par toe distal phalanx consistent with a nondisplaced fracture. There is mild \par first MTP joint arthrosis. Small plantar calcaneal enthesophyte formation is \par present. \par \par \par \par \par \par \par

## 2019-11-09 ENCOUNTER — APPOINTMENT (OUTPATIENT)
Dept: CT IMAGING | Facility: IMAGING CENTER | Age: 59
End: 2019-11-09

## 2019-11-14 ENCOUNTER — OUTPATIENT (OUTPATIENT)
Dept: OUTPATIENT SERVICES | Facility: HOSPITAL | Age: 59
LOS: 1 days | End: 2019-11-14
Payer: COMMERCIAL

## 2019-11-14 ENCOUNTER — APPOINTMENT (OUTPATIENT)
Dept: CT IMAGING | Facility: IMAGING CENTER | Age: 59
End: 2019-11-14
Payer: COMMERCIAL

## 2019-11-14 DIAGNOSIS — Z98.84 BARIATRIC SURGERY STATUS: Chronic | ICD-10-CM

## 2019-11-14 DIAGNOSIS — Z86.018 PERSONAL HISTORY OF OTHER BENIGN NEOPLASM: Chronic | ICD-10-CM

## 2019-11-14 DIAGNOSIS — Z98.890 OTHER SPECIFIED POSTPROCEDURAL STATES: Chronic | ICD-10-CM

## 2019-11-14 DIAGNOSIS — Z00.8 ENCOUNTER FOR OTHER GENERAL EXAMINATION: ICD-10-CM

## 2019-11-14 DIAGNOSIS — Z87.2 PERSONAL HISTORY OF DISEASES OF THE SKIN AND SUBCUTANEOUS TISSUE: Chronic | ICD-10-CM

## 2019-11-14 PROCEDURE — 70498 CT ANGIOGRAPHY NECK: CPT | Mod: 26

## 2019-11-14 PROCEDURE — 70496 CT ANGIOGRAPHY HEAD: CPT | Mod: 26

## 2019-11-14 PROCEDURE — 82565 ASSAY OF CREATININE: CPT

## 2019-11-14 PROCEDURE — 70498 CT ANGIOGRAPHY NECK: CPT

## 2019-11-14 PROCEDURE — 70496 CT ANGIOGRAPHY HEAD: CPT

## 2019-12-17 ENCOUNTER — APPOINTMENT (OUTPATIENT)
Dept: ELECTROPHYSIOLOGY | Facility: CLINIC | Age: 59
End: 2019-12-17

## 2019-12-26 ENCOUNTER — APPOINTMENT (OUTPATIENT)
Dept: ELECTROPHYSIOLOGY | Facility: CLINIC | Age: 59
End: 2019-12-26
Payer: COMMERCIAL

## 2019-12-26 VITALS
HEART RATE: 71 BPM | SYSTOLIC BLOOD PRESSURE: 143 MMHG | BODY MASS INDEX: 39.37 KG/M2 | OXYGEN SATURATION: 98 % | DIASTOLIC BLOOD PRESSURE: 87 MMHG | WEIGHT: 275 LBS | HEIGHT: 70 IN

## 2019-12-26 DIAGNOSIS — R00.2 PALPITATIONS: ICD-10-CM

## 2019-12-26 PROCEDURE — 99213 OFFICE O/P EST LOW 20 MIN: CPT

## 2019-12-26 PROCEDURE — 93000 ELECTROCARDIOGRAM COMPLETE: CPT | Mod: 59

## 2019-12-26 PROCEDURE — 93280 PM DEVICE PROGR EVAL DUAL: CPT

## 2019-12-27 ENCOUNTER — APPOINTMENT (OUTPATIENT)
Dept: ELECTROPHYSIOLOGY | Facility: CLINIC | Age: 59
End: 2019-12-27

## 2020-01-01 ENCOUNTER — NON-APPOINTMENT (OUTPATIENT)
Age: 60
End: 2020-01-01

## 2020-01-01 NOTE — DISCUSSION/SUMMARY
[FreeTextEntry1] : Ildefonso Medina is a 58y/o man with Hx of HTN, HLD, BPH, all of which are stable, ROSIO, anxiety, ETOH abuse, obesity s/p gastric sleeve in 2004, and recurring syncope with noted NSVT associated with dizziness s/p ICD placement on 4/11/2019 who presents today for routine f/u. \par \par Impression:\par \par 1. AT: device check reveals brief episodes of AT, < 1 min in duration. Has not increased metoprolol as of yet. Will increase to 75mg daily. \par \par 2. NSVT/VT: ICD check reveals: no episodes of VT/NSVT on ICD. ICD in good working status with adequate pacing/sensing thresholds. Will resume regular f/u in device as scheduled. \par \par 3. HTN: resume oral antihypertensives as prescribed. Encouraged heart healthy diet, sodium restriction, and weight loss. Continue regular f/u with Cardiologist for further HTN management.\par \par 4. HLD: resume statin therapy as prescribed and regular f/u with Cardiologist for routine lipid monitoring and management.\par \par Will increase metoprolol to 75mg daily and RTO for f/u in 6 months.

## 2020-01-01 NOTE — HISTORY OF PRESENT ILLNESS
[FreeTextEntry1] : Evangelist Johnson MD\par \par Ildefonso Medina is a 60y/o man with Hx of HTN, HLD, BPH, all of which are stable, ROSIO, anxiety, ETOH abuse, obesity s/p gastric sleeve in 2004, and recurring syncope with noted NSVT associated with dizziness s/p ICD placement on 4/11/2019 who presents today for routine f/u. On last visit, was experiencing episodes of palpitations correlating with short runs of AT on ICD. Metoprolol increased to 75mg daily at that time. Admits not taking increased dose but has been feeling well. Has occasional palpitations. Still feeling dizzy and following with Neurology. Denies chest pain, SOB, syncope or near syncope.\par

## 2020-01-28 ENCOUNTER — APPOINTMENT (OUTPATIENT)
Dept: ELECTROPHYSIOLOGY | Facility: CLINIC | Age: 60
End: 2020-01-28

## 2020-02-26 NOTE — ED ADULT NURSE NOTE - NSIMPLEMENTINTERV_GEN_ALL_ED
Implemented All Universal Safety Interventions:  [place] to call system. Call bell, personal items and telephone within reach. Instruct patient to call for assistance. Room bathroom lighting operational. Non-slip footwear when patient is off stretcher. Physically safe environment: no spills, clutter or unnecessary equipment. Stretcher in lowest position, wheels locked, appropriate side rails in place. no

## 2020-03-26 ENCOUNTER — APPOINTMENT (OUTPATIENT)
Dept: ELECTROPHYSIOLOGY | Facility: CLINIC | Age: 60
End: 2020-03-26
Payer: COMMERCIAL

## 2020-03-26 PROCEDURE — 93296 REM INTERROG EVL PM/IDS: CPT

## 2020-03-26 PROCEDURE — 93295 DEV INTERROG REMOTE 1/2/MLT: CPT

## 2020-06-29 ENCOUNTER — APPOINTMENT (OUTPATIENT)
Dept: ELECTROPHYSIOLOGY | Facility: CLINIC | Age: 60
End: 2020-06-29

## 2020-06-29 ENCOUNTER — APPOINTMENT (OUTPATIENT)
Dept: ELECTROPHYSIOLOGY | Facility: CLINIC | Age: 60
End: 2020-06-29
Payer: COMMERCIAL

## 2020-06-29 DIAGNOSIS — I47.1 SUPRAVENTRICULAR TACHYCARDIA: ICD-10-CM

## 2020-06-29 DIAGNOSIS — E78.5 HYPERLIPIDEMIA, UNSPECIFIED: ICD-10-CM

## 2020-06-29 DIAGNOSIS — Z95.810 PRESENCE OF AUTOMATIC (IMPLANTABLE) CARDIAC DEFIBRILLATOR: ICD-10-CM

## 2020-06-29 DIAGNOSIS — I47.2 VENTRICULAR TACHYCARDIA: ICD-10-CM

## 2020-06-29 DIAGNOSIS — I10 ESSENTIAL (PRIMARY) HYPERTENSION: ICD-10-CM

## 2020-06-29 PROCEDURE — 99213 OFFICE O/P EST LOW 20 MIN: CPT | Mod: 95

## 2020-06-29 NOTE — HISTORY OF PRESENT ILLNESS
[FreeTextEntry1] : Evangelist Johnson MD\par \par Ildefonso Medina is a 61y/o man with Hx of HTN, HLD, BPH, all of which are stable, ROSIO, anxiety, ETOH abuse, obesity s/p gastric sleeve in 2004, and recurring syncope with noted NSVT associated with dizziness s/p ICD placement on 4/11/2019 who presents today for routine f/u. Has been having some PMT on device and fatigue. Has occasional palpitations. Still feeling dizzy and following with Neurology. Denies chest pain, SOB, syncope or near syncope.\par

## 2020-06-29 NOTE — PHYSICAL EXAM
[General Appearance - Well Developed] : well developed [Well Groomed] : well groomed [General Appearance - Well Nourished] : well nourished [Normal Appearance] : normal appearance [No Deformities] : no deformities [General Appearance - In No Acute Distress] : no acute distress [FreeTextEntry1] : Visit performed via TELE HEALTH, unable to complete physical exam

## 2020-06-29 NOTE — DISCUSSION/SUMMARY
[FreeTextEntry1] : Ildefonso Medina is a 59y/o man with Hx of HTN, HLD, BPH, all of which are stable, ROSIO, anxiety, ETOH abuse, obesity s/p gastric sleeve in 2004, and recurring syncope with noted NSVT associated with dizziness s/p ICD placement on 4/11/2019 who presents today for routine f/u. \par \par Impression:\par \par 1. AT: Resume metoprolol 75mg daily for AT/PMT suppression. \par \par 2. NSVT/VT: ICD check reveals: no episodes of VT/NSVT on ICD. Will resume regular f/u in device as scheduled. \par \par 3. HTN: resume oral antihypertensives as prescribed. Encouraged heart healthy diet, sodium restriction, and weight loss. Continue regular f/u with Cardiologist for further HTN management.\par \par 4. HLD: resume statin therapy as prescribed and regular f/u with Cardiologist for routine lipid monitoring and management.\par \par Will continue f/u with Cardiologist and device clinic and may RTO as needed or if any new or worsening symptoms or findings occur.\par \par This TELEHealth visit required approximately 20 minutes in total over the telephone.

## 2020-06-29 NOTE — REASON FOR VISIT
[Home] : at home, [unfilled] , at the time of the visit. [Medical Office: ([facility])___] : at the medical office located in  [Follow-Up - Clinic] : a clinic follow-up of [Ventricular Tachycardia] : ventricular tachycardia

## 2020-07-02 ENCOUNTER — APPOINTMENT (OUTPATIENT)
Dept: ELECTROPHYSIOLOGY | Facility: CLINIC | Age: 60
End: 2020-07-02
Payer: COMMERCIAL

## 2020-07-02 PROCEDURE — 93296 REM INTERROG EVL PM/IDS: CPT

## 2020-07-02 PROCEDURE — 93295 DEV INTERROG REMOTE 1/2/MLT: CPT

## 2020-10-01 ENCOUNTER — APPOINTMENT (OUTPATIENT)
Dept: ELECTROPHYSIOLOGY | Facility: CLINIC | Age: 60
End: 2020-10-01

## 2020-11-09 NOTE — ED PROVIDER NOTE - DATE/TIME 1
DISPLAY PLAN FREE TEXT DISPLAY PLAN FREE TEXT DISPLAY PLAN FREE TEXT DISPLAY PLAN FREE TEXT DISPLAY PLAN FREE TEXT DISPLAY PLAN FREE TEXT DISPLAY PLAN FREE TEXT DISPLAY PLAN FREE TEXT DISPLAY PLAN FREE TEXT 06-Apr-2019 19:14

## 2020-12-30 NOTE — ED ADULT NURSE NOTE - NSSUSCREENINGQ3_ED_ALL_ED
[___ Month(s)] : [unfilled] month(s) [With Me] : with me [FreeTextEntry1] : \par Hypertension: Recent episode of severe uncontrolled hypertension - now better with titration of medications; recommend continuing metoprolol and amlodipine at current doses -- may need titration of antihypertensive regimen in the upcoming month (I asked her to schedule an appointment to see me in my [place] office).\par \par Coronary artery disease: Mild, nonobstructive disease; continue aspirin and rosuvastatin.\par \par Hyperlipidemia: Tolerating rosuvastatin. No

## 2021-01-04 ENCOUNTER — RX RENEWAL (OUTPATIENT)
Age: 61
End: 2021-01-04

## 2021-01-04 RX ORDER — METOPROLOL SUCCINATE 50 MG/1
50 TABLET, EXTENDED RELEASE ORAL DAILY
Qty: 135 | Refills: 0 | Status: ACTIVE | COMMUNITY
Start: 2021-01-04 | End: 1900-01-01

## 2021-10-29 NOTE — PATIENT PROFILE ADULT - NSTRANSFERBELONGINGSDISPO_GEN_A_NUR
Ongoing SW/CM Assessment/Plan of Care Note     See SW/CM flowsheets for goals and other objective data.    Patient/Family discharge goal (s):  Goal #1: Psychosocial needs assessed  Goal #2: Establish present or future health care decison-maker  Goal #3: Home Care arranged or issues addressed    PT Recommendation:  Recommendation for Discharge: PT WI: Sub-acute nursing home       OT Recommendation:  Recommendations for Discharge: OT WI: Sub-acute nursing home, Less intensive rehab       SLP Recommendation:  Recommendations for Discharge: SLP: No anticipated ST needs on d/c    Disposition:  Planned Discharge Destination: Location not determined at this time    Progress note:   Met with pt who appeared alert, quite pleasant and appropriate and introduced self and role and reason for visit.  Pt unaware as to potential discharge date but agreed to need for rehab at discharge and is fine with skilled nursing facility (SNF) referrals to [facility].  Pt still has an NG tube which will preclude SNF admissions but per MD intends to remove same in next day or so.  Pt has a few clothing items here but may need additional from the clothing closet.  Pt did consent to contact with his brother as needed.      Provided [facility] document and explanation of same for pt review.  Have e-faxed referrals to:[facility], [facility], [facility], [place], [facility], [facility], [facility], [facility], [facility], [facility], [place].  Await responses.         with patient

## 2023-06-20 NOTE — H&P ADULT - NEGATIVE PSYCHIATRIC SYMPTOMS
no rashes , no jaundice present , good turgor , no masses , no tenderness on palpation no suicidal ideation

## 2023-08-15 NOTE — DISCHARGE NOTE ADULT - INSTRUCTIONS
Patients last appointment 5/24/2023. Patients next scheduled appointment No future appointments.
Bariatric phase 1 full liquid diet for 4 weeks. follow up with your nutritionist in 30 days. no carbonated beverage

## 2023-11-16 NOTE — ED ADULT NURSE NOTE - INTEGUMENTARY WDL
Patient was screened by occupational therapy on 11/16/23 and does not demonstrate the need for any physical therapy  follow up.  The patient's order was completed at this time. See Occuational therapy note for information on gait and transfers.    Color consistent with ethnicity/race, warm, dry intact, resilient.

## 2024-03-20 NOTE — ED CDU PROVIDER INITIAL DAY NOTE - ATTENDING CONTRIBUTION TO CARE
Detail Level: Detailed Lesion Type: Cyst Method: 11 blade Curette: No Anesthesia Type: 1% lidocaine with epinephrine and a 1:10 solution of 8.4% sodium bicarbonate Size Of Lesion In Cm (Optional But May Be Required For Some Insurances): 0 Wound Care: Petrolatum Dressing: dry sterile dressing Epidermal Sutures: 4-0 Ethilon Epidermal Closure: simple interrupted Suture Text: The incision was partially closed with Preparation Text: The area was prepped in the usual clean fashion. Curette Text (Optional): After the contents were expressed a curette was used to partially remove the cyst wall. Consent was obtained and risks were reviewed including but not limited to delayed wound healing, infection, need for multiple I and D's, and pain. Post-Care Instructions: I reviewed with the patient in detail post-care instructions. Patient should keep wound covered and call the office should any redness, pain, swelling or worsening occur. Dr. Sharpe: I performed a face to face bedside interview with patient regarding history of present illness, review of symptoms and past medical history. I completed an independent physical exam.  I have discussed patient's plan of care with PA.   I agree with note as stated above, having amended the EMR as needed to reflect my findings.   This includes HISTORY OF PRESENT ILLNESS, HIV, PAST MEDICAL/SURGICAL/FAMILY/SOCIAL HISTORY, ALLERGIES AND HOME MEDICATIONS, REVIEW OF SYSTEMS, PHYSICAL EXAM, and any PROGRESS NOTES during the time I functioned as the attending physician for this patient. 59M s/p micturition syncope. Main ED ekg nonisch, trauma cat scans negative, CDU for tele, trop #2, and echo. AM no complaints. NAD, CTABL, RRR, S1S2, ABD SOFT NTND. PLAN c/w tele, f/u echo.

## 2024-05-15 NOTE — DISCHARGE NOTE ADULT - DO YOU HAVE DIFFICULTY CLIMBING STAIRS
pt with h/o suicide attempts in the past, etoh abuse, on benzos BIBEMS for overdose and suicidal text to wife, minimal HPI available at this time, was found on floor and pt does mention a fall - will need to check basic labs, tox labs, psych labs, ct for trauma, will observe, if No

## 2025-05-27 NOTE — ED CDU PROVIDER INITIAL DAY NOTE - GASTROINTESTINAL, MLM
Please complete the blood and urine test and will advise if any other workup is needed.  If results are normal, please continue to monitor calcium levels in 6 months (09/2025) with PCP or new endocrinologist   
Abdomen soft, non-tender, no rebound, no guarding.